# Patient Record
Sex: FEMALE | Race: WHITE | URBAN - METROPOLITAN AREA
[De-identification: names, ages, dates, MRNs, and addresses within clinical notes are randomized per-mention and may not be internally consistent; named-entity substitution may affect disease eponyms.]

---

## 2017-01-02 ENCOUNTER — APPOINTMENT (OUTPATIENT)
Dept: LAB | Facility: HOSPITAL | Age: 27
End: 2017-01-02
Attending: ADVANCED PRACTICE MIDWIFE
Payer: COMMERCIAL

## 2017-01-02 DIAGNOSIS — O20.0 THREATENED ABORTION IN EARLY PREGNANCY: ICD-10-CM

## 2017-01-02 LAB — B-HCG SERPL-ACNC: NORMAL MIU/ML

## 2017-01-02 PROCEDURE — 84702 CHORIONIC GONADOTROPIN TEST: CPT

## 2017-01-02 PROCEDURE — 36415 COLL VENOUS BLD VENIPUNCTURE: CPT

## 2017-01-03 ENCOUNTER — TELEPHONE (OUTPATIENT)
Dept: OBGYN CLINIC | Facility: CLINIC | Age: 27
End: 2017-01-03

## 2017-01-06 ENCOUNTER — HOSPITAL ENCOUNTER (OUTPATIENT)
Dept: ULTRASOUND IMAGING | Facility: HOSPITAL | Age: 27
Discharge: HOME | End: 2017-01-06
Attending: ADVANCED PRACTICE MIDWIFE
Payer: COMMERCIAL

## 2017-01-06 ENCOUNTER — OFFICE VISIT (OUTPATIENT)
Dept: OBGYN CLINIC | Facility: CLINIC | Age: 27
End: 2017-01-06

## 2017-01-06 VITALS — WEIGHT: 134 LBS | SYSTOLIC BLOOD PRESSURE: 122 MMHG | DIASTOLIC BLOOD PRESSURE: 70 MMHG | BODY MASS INDEX: 22 KG/M2

## 2017-01-06 DIAGNOSIS — O20.0 THREATENED ABORTION, ANTEPARTUM: Primary | ICD-10-CM

## 2017-01-06 DIAGNOSIS — O09.891 MEDICATION EXPOSURE DURING FIRST TRIMESTER OF PREGNANCY: ICD-10-CM

## 2017-01-06 DIAGNOSIS — O20.0 THREATENED ABORTION IN EARLY PREGNANCY: ICD-10-CM

## 2017-01-06 PROBLEM — F90.9 ADHD (ATTENTION DEFICIT HYPERACTIVITY DISORDER): Status: ACTIVE | Noted: 2017-01-06

## 2017-01-06 PROCEDURE — 76801 OB US < 14 WKS SINGLE FETUS: CPT

## 2017-01-06 PROCEDURE — 76817 TRANSVAGINAL US OBSTETRIC: CPT

## 2017-01-06 PROCEDURE — 99213 OFFICE O/P EST LOW 20 MIN: CPT | Performed by: ADVANCED PRACTICE MIDWIFE

## 2017-01-06 NOTE — PROGRESS NOTES
HPI:    Patient ID: Fabienne Bowen is a 27year old female presents for threatened AB follow up. Reports bleeding mostly resolved, but does notice some spotting on tissue after intercourse. Just came from u/s.  Reports hx of ADHD and depression say Psych and possible. No orders of the defined types were placed in this encounter.        Meds This Visit:  No prescriptions requested or ordered in this encounter    Imaging & Referrals:  None       #[id number]

## 2017-01-06 NOTE — PROGRESS NOTES
Order and Referral for MFM consult and FTS faxed to MFM at [facility]. They will call pt to schedule appt.

## 2017-01-19 ENCOUNTER — NURSE ONLY (OUTPATIENT)
Dept: OBGYN CLINIC | Facility: CLINIC | Age: 27
End: 2017-01-19

## 2017-01-19 VITALS — WEIGHT: 137.38 LBS | HEIGHT: 65 IN | BODY MASS INDEX: 22.89 KG/M2

## 2017-01-19 DIAGNOSIS — Z34.01 ENCOUNTER FOR SUPERVISION OF NORMAL FIRST PREGNANCY IN FIRST TRIMESTER: Primary | ICD-10-CM

## 2017-01-19 NOTE — PROGRESS NOTES
NOB Education complete and information given to pt. Pt has 2 cats. TOXO titer ordered with NOB labs. Pt states no hx of abnormal paps. Pt has appt scheduled for FTS with MFM. Pt completed EPDS Screen and scored 10.   Pt was advised to f/u with her psyc

## 2017-02-10 ENCOUNTER — HOSPITAL ENCOUNTER (OUTPATIENT)
Dept: PERINATAL CARE | Facility: HOSPITAL | Age: 27
Discharge: HOME | End: 2017-02-10
Attending: OBSTETRICS & GYNECOLOGY
Payer: COMMERCIAL

## 2017-02-10 VITALS — SYSTOLIC BLOOD PRESSURE: 116 MMHG | HEART RATE: 67 BPM | DIASTOLIC BLOOD PRESSURE: 70 MMHG

## 2017-02-10 DIAGNOSIS — O09.891 MEDICATION EXPOSURE DURING FIRST TRIMESTER OF PREGNANCY: ICD-10-CM

## 2017-02-10 DIAGNOSIS — Z36.9 FIRST TRIMESTER SCREENING: Primary | ICD-10-CM

## 2017-02-10 DIAGNOSIS — Z36.9 FIRST TRIMESTER SCREENING: ICD-10-CM

## 2017-02-10 PROCEDURE — 76813 OB US NUCHAL MEAS 1 GEST: CPT | Performed by: OBSTETRICS & GYNECOLOGY

## 2017-02-10 PROCEDURE — 99243 OFF/OP CNSLTJ NEW/EST LOW 30: CPT | Performed by: OBSTETRICS & GYNECOLOGY

## 2017-02-10 PROCEDURE — 76813 OB US NUCHAL MEAS 1 GEST: CPT

## 2017-02-10 NOTE — PROGRESS NOTES
Luana Ramos is a 27year old female. Reason for Consult:   First Trimester Screen. Medication exposure. PCOS. Was on Adderal, Lamictal, & Vyvance in September.   Review of History:     OB History:    Obstetric History     T0    TAB0  SA Registry (NAAED) suggest an increased incidence of cleft lip and/or cleft palate following first trimester exposure. Healthcare providers may enroll patients in the Lamotrigine(Lamictal ) Pregnancy Registry by calling [phone].   Lamictal is not recom Operative delivery   *  mortality   *  respiratory problems and metabolic complications   The spontaneous  rate in women with PCOS is 20 to 40 percent higher than the baseline in the general obstetric population.  No trials have bee care of your patient. Please do not hesitate to call with any questions or concerns. Total patient time was 40 minutes in evaluation, consultation, and coordination of care.   Greater than 50% of this time was spent in face to face discussion with the

## 2017-02-11 ENCOUNTER — INITIAL PRENATAL (OUTPATIENT)
Dept: OBGYN CLINIC | Facility: CLINIC | Age: 27
End: 2017-02-11

## 2017-02-11 VITALS
WEIGHT: 139 LBS | BODY MASS INDEX: 23 KG/M2 | DIASTOLIC BLOOD PRESSURE: 73 MMHG | SYSTOLIC BLOOD PRESSURE: 119 MMHG | HEART RATE: 73 BPM

## 2017-02-11 DIAGNOSIS — Z11.3 SCREEN FOR STD (SEXUALLY TRANSMITTED DISEASE): ICD-10-CM

## 2017-02-11 DIAGNOSIS — Z34.01 ENCOUNTER FOR SUPERVISION OF NORMAL FIRST PREGNANCY IN FIRST TRIMESTER: Primary | ICD-10-CM

## 2017-02-11 PROBLEM — F32.A ANXIETY AND DEPRESSION: Status: ACTIVE | Noted: 2017-02-11

## 2017-02-11 PROBLEM — F41.9 ANXIETY AND DEPRESSION: Status: ACTIVE | Noted: 2017-02-11

## 2017-02-11 LAB
APPEARANCE: CLEAR
MULTISTIX LOT#: NORMAL NUMERIC
PH, URINE: 5.5 (ref 4.5–8)
SPECIFIC GRAVITY: 1 (ref 1–1.03)
URINE-COLOR: YELLOW
UROBILINOGEN,SEMI-QN: 0 MG/DL (ref 0–1.9)

## 2017-02-11 PROCEDURE — 90686 IIV4 VACC NO PRSV 0.5 ML IM: CPT | Performed by: ADVANCED PRACTICE MIDWIFE

## 2017-02-11 PROCEDURE — 90471 IMMUNIZATION ADMIN: CPT | Performed by: ADVANCED PRACTICE MIDWIFE

## 2017-02-11 NOTE — PROGRESS NOTES
Had normal 1st tri screen. ADHD, Anxiety & depression stopped meds d/t pregnancy. Seeing Psychiatrist, recently started her on new med, still to , she unsure of name. Will let us know when she gets. Denies pain or bleeding. Normal pap 2 yrs ago.  Nor

## 2017-02-12 LAB
C TRACH DNA SPEC QL NAA+PROBE: NEGATIVE
N GONORRHOEA DNA SPEC QL NAA+PROBE: NEGATIVE

## 2017-02-14 ENCOUNTER — TELEPHONE (OUTPATIENT)
Dept: PERINATAL CARE | Facility: HOSPITAL | Age: 27
End: 2017-02-14

## 2017-02-14 ENCOUNTER — TELEPHONE (OUTPATIENT)
Dept: OBGYN CLINIC | Facility: CLINIC | Age: 27
End: 2017-02-14

## 2017-02-22 ENCOUNTER — TELEPHONE (OUTPATIENT)
Dept: OBGYN CLINIC | Facility: CLINIC | Age: 27
End: 2017-02-22

## 2017-02-22 NOTE — TELEPHONE ENCOUNTER
Pt was advised she has overdue PN labs that need to be completed ASAP.   Pt was advised on what needs to be done, to take the HIV consent sheet with her, do not eat for 1 hr prior to going to the lab and that she will remain at the lab for 1 hr after she dr

## 2017-03-07 ENCOUNTER — TELEPHONE (OUTPATIENT)
Dept: OBGYN CLINIC | Facility: CLINIC | Age: 27
End: 2017-03-07

## 2017-03-07 NOTE — TELEPHONE ENCOUNTER
Pt 16w0d calling to report dull aching pain on the lower right side of pelvic/groin area that started yesterday. Pt stated that the dull ache is constant and rates it as a 3/10.  Pt also reports that she is experiencing intermittent sharp/shooting pains in

## 2017-03-09 ENCOUNTER — TELEPHONE (OUTPATIENT)
Dept: OBGYN CLINIC | Facility: CLINIC | Age: 27
End: 2017-03-09

## 2017-03-09 NOTE — TELEPHONE ENCOUNTER
PT NOTIFIED OF MEDS FROM OTC LIST. REMINDED NOT TO TAKE ANYTHING WITH IBUPROFEN, ETOH, ASA OR ZINC. IF SYMPTOMS NOT RELIEVED WITH OTC MEDS OR IF RUNS TEMP, HAS YELLOW/GREEN MUCOUS TO SEE PCP. PT VERBALIZED UNDERSTANDING.

## 2017-03-11 ENCOUNTER — ROUTINE PRENATAL (OUTPATIENT)
Dept: OBGYN CLINIC | Facility: CLINIC | Age: 27
End: 2017-03-11

## 2017-03-11 ENCOUNTER — LAB ENCOUNTER (OUTPATIENT)
Dept: LAB | Facility: HOSPITAL | Age: 27
End: 2017-03-11
Attending: ADVANCED PRACTICE MIDWIFE
Payer: COMMERCIAL

## 2017-03-11 VITALS
WEIGHT: 149.81 LBS | DIASTOLIC BLOOD PRESSURE: 75 MMHG | HEART RATE: 97 BPM | BODY MASS INDEX: 25 KG/M2 | SYSTOLIC BLOOD PRESSURE: 120 MMHG

## 2017-03-11 DIAGNOSIS — F32.A ANXIETY AND DEPRESSION: ICD-10-CM

## 2017-03-11 DIAGNOSIS — F41.9 ANXIETY AND DEPRESSION: ICD-10-CM

## 2017-03-11 DIAGNOSIS — Z34.02 ENCOUNTER FOR SUPERVISION OF NORMAL FIRST PREGNANCY IN SECOND TRIMESTER: Primary | ICD-10-CM

## 2017-03-11 DIAGNOSIS — F90.0 ATTENTION DEFICIT HYPERACTIVITY DISORDER (ADHD), PREDOMINANTLY INATTENTIVE TYPE: ICD-10-CM

## 2017-03-11 DIAGNOSIS — Z34.01 ENCOUNTER FOR SUPERVISION OF NORMAL FIRST PREGNANCY IN FIRST TRIMESTER: ICD-10-CM

## 2017-03-11 LAB
ANTIBODY SCREEN: NEGATIVE
APPEARANCE: CLEAR
BASOPHILS # BLD: 0.1 K/UL (ref 0–0.2)
BASOPHILS NFR BLD: 1 %
EOSINOPHIL # BLD: 0.4 K/UL (ref 0–0.7)
EOSINOPHIL NFR BLD: 4 %
ERYTHROCYTE [DISTWIDTH] IN BLOOD BY AUTOMATED COUNT: 12.6 % (ref 11–15)
GLUCOSE 1H P 50 G GLC PO SERPL-MCNC: 111 MG/DL
HCT VFR BLD AUTO: 35.3 % (ref 35–48)
HGB BLD-MCNC: 11.9 G/DL (ref 12–16)
LYMPHOCYTES # BLD: 2.3 K/UL (ref 1–4)
LYMPHOCYTES NFR BLD: 24 %
MCH RBC QN AUTO: 29.8 PG (ref 27–32)
MCHC RBC AUTO-ENTMCNC: 33.7 G/DL (ref 32–37)
MCV RBC AUTO: 88.4 FL (ref 80–100)
MONOCYTES # BLD: 0.7 K/UL (ref 0–1)
MONOCYTES NFR BLD: 8 %
MULTISTIX LOT#: NORMAL NUMERIC
NEUTROPHILS # BLD AUTO: 6.1 K/UL (ref 1.8–7.7)
NEUTROPHILS NFR BLD: 64 %
PH, URINE: 7.5 (ref 4.5–8)
PLATELET # BLD AUTO: 245 K/UL (ref 140–400)
PMV BLD AUTO: 7.9 FL (ref 7.4–10.3)
RBC # BLD AUTO: 3.99 M/UL (ref 3.7–5.4)
RUBV IGG SER-ACNC: 52.4 IU/ML
SPECIFIC GRAVITY: 1.01 (ref 1–1.03)
URINE-COLOR: YELLOW
UROBILINOGEN,SEMI-QN: 0.2 MG/DL (ref 0–1.9)
WBC # BLD AUTO: 9.6 K/UL (ref 4–11)

## 2017-03-11 PROCEDURE — 86778 TOXOPLASMA ANTIBODY IGM: CPT

## 2017-03-11 PROCEDURE — 85025 COMPLETE CBC W/AUTO DIFF WBC: CPT

## 2017-03-11 PROCEDURE — 36415 COLL VENOUS BLD VENIPUNCTURE: CPT

## 2017-03-11 PROCEDURE — 86803 HEPATITIS C AB TEST: CPT

## 2017-03-11 PROCEDURE — 86762 RUBELLA ANTIBODY: CPT

## 2017-03-11 PROCEDURE — 82306 VITAMIN D 25 HYDROXY: CPT

## 2017-03-11 PROCEDURE — 87389 HIV-1 AG W/HIV-1&-2 AB AG IA: CPT

## 2017-03-11 PROCEDURE — 86780 TREPONEMA PALLIDUM: CPT

## 2017-03-11 PROCEDURE — 87086 URINE CULTURE/COLONY COUNT: CPT

## 2017-03-11 PROCEDURE — 87340 HEPATITIS B SURFACE AG IA: CPT

## 2017-03-11 PROCEDURE — 82950 GLUCOSE TEST: CPT

## 2017-03-11 PROCEDURE — 86777 TOXOPLASMA ANTIBODY: CPT

## 2017-03-11 PROCEDURE — 86850 RBC ANTIBODY SCREEN: CPT

## 2017-03-11 NOTE — PROGRESS NOTES
+ FM Pt reports struggling at work since off meds. No longer seeing psychiatrist in city due to commute. Stable and supportive FOB and his family. Desires referral to psychiatrist.  Rigoberto Burns information and referral to navigator given.   Warning signs

## 2017-03-13 ENCOUNTER — APPOINTMENT (OUTPATIENT)
Dept: ULTRASOUND IMAGING | Facility: HOSPITAL | Age: 27
End: 2017-03-13
Attending: NURSE PRACTITIONER
Payer: COMMERCIAL

## 2017-03-13 ENCOUNTER — HOSPITAL ENCOUNTER (EMERGENCY)
Facility: HOSPITAL | Age: 27
Discharge: HOME | End: 2017-03-13
Payer: COMMERCIAL

## 2017-03-13 ENCOUNTER — TELEPHONE (OUTPATIENT)
Dept: OBGYN CLINIC | Facility: CLINIC | Age: 27
End: 2017-03-13

## 2017-03-13 VITALS
RESPIRATION RATE: 16 BRPM | TEMPERATURE: 98 F | DIASTOLIC BLOOD PRESSURE: 56 MMHG | BODY MASS INDEX: 24 KG/M2 | SYSTOLIC BLOOD PRESSURE: 124 MMHG | OXYGEN SATURATION: 98 % | WEIGHT: 143 LBS | HEART RATE: 88 BPM

## 2017-03-13 DIAGNOSIS — O26.892 ABDOMINAL PAIN IN PREGNANCY, SECOND TRIMESTER: Primary | ICD-10-CM

## 2017-03-13 DIAGNOSIS — R10.9 ABDOMINAL PAIN IN PREGNANCY, SECOND TRIMESTER: Primary | ICD-10-CM

## 2017-03-13 LAB
25(OH)D3 SERPL-MCNC: 20.8 NG/ML
ALBUMIN SERPL BCP-MCNC: 3.4 G/DL (ref 3.5–4.8)
ALP SERPL-CCNC: 48 U/L (ref 32–100)
ALT SERPL-CCNC: 34 U/L (ref 14–54)
ANION GAP SERPL CALC-SCNC: 9 MMOL/L (ref 0–18)
AST SERPL-CCNC: 27 U/L (ref 15–41)
BASOPHILS # BLD: 0.1 K/UL (ref 0–0.2)
BASOPHILS NFR BLD: 1 %
BILIRUB DIRECT SERPL-MCNC: 0.1 MG/DL (ref 0–0.2)
BILIRUB SERPL-MCNC: 0.4 MG/DL (ref 0.3–1.2)
BILIRUB UR QL: NEGATIVE
BUN SERPL-MCNC: 5 MG/DL (ref 8–20)
BUN/CREAT SERPL: 10 (ref 10–20)
CALCIUM SERPL-MCNC: 9.2 MG/DL (ref 8.5–10.5)
CHLORIDE SERPL-SCNC: 105 MMOL/L (ref 95–110)
CLARITY UR: CLEAR
CO2 SERPL-SCNC: 23 MMOL/L (ref 22–32)
COLOR UR: YELLOW
CREAT SERPL-MCNC: 0.5 MG/DL (ref 0.5–1.5)
EOSINOPHIL # BLD: 0.3 K/UL (ref 0–0.7)
EOSINOPHIL NFR BLD: 4 %
ERYTHROCYTE [DISTWIDTH] IN BLOOD BY AUTOMATED COUNT: 12.7 % (ref 11–15)
GLUCOSE SERPL-MCNC: 86 MG/DL (ref 70–99)
GLUCOSE UR-MCNC: NEGATIVE MG/DL
HBV SURFACE AG SERPL QL IA: NONREACTIVE
HCT VFR BLD AUTO: 35.7 % (ref 35–48)
HCV AB SERPL QL IA: NONREACTIVE
HGB BLD-MCNC: 12.2 G/DL (ref 12–16)
HGB UR QL STRIP.AUTO: NEGATIVE
HIV1+2 AB SERPL QL IA: NONREACTIVE
KETONES UR-MCNC: NEGATIVE MG/DL
LEUKOCYTE ESTERASE UR QL STRIP.AUTO: NEGATIVE
LIPASE SERPL-CCNC: 28 U/L (ref 22–51)
LYMPHOCYTES # BLD: 2.6 K/UL (ref 1–4)
LYMPHOCYTES NFR BLD: 28 %
MCH RBC QN AUTO: 30 PG (ref 27–32)
MCHC RBC AUTO-ENTMCNC: 34.1 G/DL (ref 32–37)
MCV RBC AUTO: 88 FL (ref 80–100)
MONOCYTES # BLD: 0.8 K/UL (ref 0–1)
MONOCYTES NFR BLD: 8 %
NEUTROPHILS # BLD AUTO: 5.4 K/UL (ref 1.8–7.7)
NEUTROPHILS NFR BLD: 59 %
NITRITE UR QL STRIP.AUTO: NEGATIVE
OSMOLALITY UR CALC.SUM OF ELEC: 281 MOSM/KG (ref 275–295)
PH UR: 7 [PH] (ref 5–8)
PLATELET # BLD AUTO: 265 K/UL (ref 140–400)
PMV BLD AUTO: 7.5 FL (ref 7.4–10.3)
POTASSIUM SERPL-SCNC: 3.8 MMOL/L (ref 3.3–5.1)
PROT SERPL-MCNC: 6.5 G/DL (ref 5.9–8.4)
PROT UR-MCNC: NEGATIVE MG/DL
RBC # BLD AUTO: 4.06 M/UL (ref 3.7–5.4)
SODIUM SERPL-SCNC: 137 MMOL/L (ref 136–144)
SP GR UR STRIP: 1 (ref 1–1.03)
T PALLIDUM AB SER QL: NEGATIVE
UROBILINOGEN UR STRIP-ACNC: <2
VIT C UR-MCNC: NEGATIVE MG/DL
WBC # BLD AUTO: 9.2 K/UL (ref 4–11)

## 2017-03-13 PROCEDURE — 80048 BASIC METABOLIC PNL TOTAL CA: CPT

## 2017-03-13 PROCEDURE — 36415 COLL VENOUS BLD VENIPUNCTURE: CPT

## 2017-03-13 PROCEDURE — 99284 EMERGENCY DEPT VISIT MOD MDM: CPT

## 2017-03-13 PROCEDURE — 85025 COMPLETE CBC W/AUTO DIFF WBC: CPT

## 2017-03-13 PROCEDURE — 76815 OB US LIMITED FETUS(S): CPT | Performed by: RADIOLOGY

## 2017-03-13 PROCEDURE — 83690 ASSAY OF LIPASE: CPT | Performed by: NURSE PRACTITIONER

## 2017-03-13 PROCEDURE — 81003 URINALYSIS AUTO W/O SCOPE: CPT

## 2017-03-13 PROCEDURE — 80076 HEPATIC FUNCTION PANEL: CPT | Performed by: NURSE PRACTITIONER

## 2017-03-13 NOTE — ED NOTES
Pt requesting to eat    Per Eran Schneider NP ok for pt to eat    [place] and juice provided to pt.

## 2017-03-13 NOTE — TELEPHONE ENCOUNTER
Pt is 16.6 weeks and c/o past 2-3 days she has been having abdominal cramping like a menses. Pt states pain today has increased in the whole abdomen and the cramping is constant. Pt states she is not able to sit up straight today.    Pt rates the pain at

## 2017-03-13 NOTE — TELEPHONE ENCOUNTER
Patient has been waiting in the ER now for 1 hour and was informed she will have to probably wait another 2 hours. Per MBW in office she can listen to heart tone in office but would have to be evaluated after by the ER due to the extreme pain.  Patient stat

## 2017-03-14 LAB
TOXOPLASMA GONDII AB, IGG: 45.2 IU/ML
TOXOPLASMA GONDII AB, IGM: <3 AU/ML

## 2017-03-14 NOTE — ED PROVIDER NOTES
Patient Seen in: [facility] AND [facility] Emergency Department    History   Patient presents with:  Abdomen/Flank Pain (GI/)  Pregnancy Issues (gynecologic)    Stated Complaint: 17wks preg.  abd cramping    HPI    29-year-old female,  1 para 0 17 weeks 1342 85   Resp 03/13/17 1342 18   Temp 03/13/17 1342 97.9 °F (36.6 °C)   Temp src 03/13/17 1342 Oral   SpO2 03/13/17 1342 99 %   O2 Device 03/13/17 1603 None (Room air)       Current:/56 mmHg  Pulse 88  Temp(Src) 97.9 °F (36.6 °C) (Oral)  Resp 16  Wt DARK GREEN   RAINBOW DRAW LAVENDER TALL (BNP)   CBC W/ DIFFERENTIAL       MDM   Consult with [facility] midwife  Unaware of the ultrasound findings and normal lab work. No white count no concern for cholecystitis.   Patient will follow-up in the office saman hyman

## 2017-04-08 ENCOUNTER — ROUTINE PRENATAL (OUTPATIENT)
Dept: OBGYN CLINIC | Facility: CLINIC | Age: 27
End: 2017-04-08

## 2017-04-08 VITALS
WEIGHT: 154.63 LBS | SYSTOLIC BLOOD PRESSURE: 134 MMHG | DIASTOLIC BLOOD PRESSURE: 71 MMHG | HEART RATE: 76 BPM | BODY MASS INDEX: 26 KG/M2

## 2017-04-08 DIAGNOSIS — Z34.02 ENCOUNTER FOR SUPERVISION OF NORMAL FIRST PREGNANCY IN SECOND TRIMESTER: Primary | ICD-10-CM

## 2017-04-08 NOTE — PROGRESS NOTES
Active fetus  No complaints  U/S results reviewed with pt.   Pt to have Level @ u/s Warning signs reviewed All questions answered

## 2017-04-10 ENCOUNTER — HOSPITAL ENCOUNTER (OUTPATIENT)
Dept: PERINATAL CARE | Facility: HOSPITAL | Age: 27
Discharge: HOME | End: 2017-04-10
Attending: OBSTETRICS & GYNECOLOGY
Payer: COMMERCIAL

## 2017-04-10 VITALS — DIASTOLIC BLOOD PRESSURE: 57 MMHG | SYSTOLIC BLOOD PRESSURE: 119 MMHG | HEART RATE: 70 BPM

## 2017-04-10 DIAGNOSIS — O09.891 MEDICATION EXPOSURE DURING FIRST TRIMESTER OF PREGNANCY: ICD-10-CM

## 2017-04-10 DIAGNOSIS — F90.9 ATTENTION DEFICIT HYPERACTIVITY DISORDER (ADHD), UNSPECIFIED ADHD TYPE: ICD-10-CM

## 2017-04-10 DIAGNOSIS — F32.A ANXIETY AND DEPRESSION: ICD-10-CM

## 2017-04-10 DIAGNOSIS — E28.2 PCOS (POLYCYSTIC OVARIAN SYNDROME): ICD-10-CM

## 2017-04-10 DIAGNOSIS — Z36.3 SCREENING, ANTENATAL, FOR MALFORMATION BY ULTRASOUND: ICD-10-CM

## 2017-04-10 DIAGNOSIS — F41.9 ANXIETY AND DEPRESSION: ICD-10-CM

## 2017-04-10 DIAGNOSIS — O09.891 MEDICATION EXPOSURE DURING FIRST TRIMESTER OF PREGNANCY: Primary | ICD-10-CM

## 2017-04-10 PROCEDURE — 76811 OB US DETAILED SNGL FETUS: CPT

## 2017-04-10 PROCEDURE — 99214 OFFICE O/P EST MOD 30 MIN: CPT | Performed by: OBSTETRICS & GYNECOLOGY

## 2017-04-10 PROCEDURE — 76811 OB US DETAILED SNGL FETUS: CPT | Performed by: OBSTETRICS & GYNECOLOGY

## 2017-04-10 NOTE — PROGRESS NOTES
MFM follow up outpatient consultation -     S: Feeling low in her moods and having trouble concentrating. Not getting in trouble at work. Meeting with therapist and has appt with psychiatrist tomorrow to discuss medications for her mood.     Her history w studies. Teratogenic effects in animals were not observed. Lamictal crosses the human placenta and can be measured in the plasma of exposed newborns.  Preliminary data from the [address] [place] [address] Pregnancy Registry ([facility]) suggest an increase obstetrician, primary healthcare provider, and pediatrician (ACOG, 2007).  The ACOG also recommend that therapy with paroxetine(Paxil) be avoided during pregnancy if possible and that fetuses exposed in early pregnancy be assessed with a fetal echocardiogra

## 2017-04-10 NOTE — PROGRESS NOTES
Pt for Level @ U/s  Hx drug exposure 1st trimester  Low risk 1st tri  Pt aware male fetus  \"Went to a peek u/s\"  Denies pregnancy complaints

## 2017-05-06 ENCOUNTER — ROUTINE PRENATAL (OUTPATIENT)
Dept: OBGYN CLINIC | Facility: CLINIC | Age: 27
End: 2017-05-06

## 2017-05-06 VITALS
SYSTOLIC BLOOD PRESSURE: 126 MMHG | WEIGHT: 160.19 LBS | HEART RATE: 85 BPM | DIASTOLIC BLOOD PRESSURE: 75 MMHG | BODY MASS INDEX: 27 KG/M2

## 2017-05-06 DIAGNOSIS — Z34.02 ENCOUNTER FOR SUPERVISION OF NORMAL FIRST PREGNANCY IN SECOND TRIMESTER: Primary | ICD-10-CM

## 2017-06-03 ENCOUNTER — APPOINTMENT (OUTPATIENT)
Dept: LAB | Facility: HOSPITAL | Age: 27
End: 2017-06-03
Attending: ADVANCED PRACTICE MIDWIFE
Payer: COMMERCIAL

## 2017-06-03 ENCOUNTER — ROUTINE PRENATAL (OUTPATIENT)
Dept: OBGYN CLINIC | Facility: CLINIC | Age: 27
End: 2017-06-03

## 2017-06-03 VITALS
SYSTOLIC BLOOD PRESSURE: 117 MMHG | HEART RATE: 101 BPM | WEIGHT: 168.63 LBS | DIASTOLIC BLOOD PRESSURE: 75 MMHG | BODY MASS INDEX: 28 KG/M2

## 2017-06-03 DIAGNOSIS — Z34.03 ENCOUNTER FOR SUPERVISION OF NORMAL FIRST PREGNANCY IN THIRD TRIMESTER: Primary | ICD-10-CM

## 2017-06-03 DIAGNOSIS — Z34.02 ENCOUNTER FOR SUPERVISION OF NORMAL FIRST PREGNANCY IN SECOND TRIMESTER: ICD-10-CM

## 2017-06-03 PROCEDURE — 90471 IMMUNIZATION ADMIN: CPT | Performed by: ADVANCED PRACTICE MIDWIFE

## 2017-06-03 PROCEDURE — 90715 TDAP VACCINE 7 YRS/> IM: CPT | Performed by: ADVANCED PRACTICE MIDWIFE

## 2017-06-03 PROCEDURE — 85027 COMPLETE CBC AUTOMATED: CPT

## 2017-06-03 PROCEDURE — 82950 GLUCOSE TEST: CPT

## 2017-06-03 PROCEDURE — 36415 COLL VENOUS BLD VENIPUNCTURE: CPT

## 2017-06-03 NOTE — PROGRESS NOTES
Feeling well, active baby. Denies s/s PTL. Tdap today. Having 3 T labs done following visit. Reviewed water birth study and consent given for review. Discussed healthy weight gain in pregnancy and importance of healthy diet and exercise.  Reviewed Domo hyman

## 2017-06-08 ENCOUNTER — TELEPHONE (OUTPATIENT)
Dept: OBGYN CLINIC | Facility: CLINIC | Age: 27
End: 2017-06-08

## 2017-06-08 DIAGNOSIS — O99.810 ABNORMAL GLUCOSE TOLERANCE TEST DURING PREGNANCY, NOT YET DELIVERED: ICD-10-CM

## 2017-06-08 DIAGNOSIS — Z34.03 ENCOUNTER FOR SUPERVISION OF NORMAL FIRST PREGNANCY IN THIRD TRIMESTER: Primary | ICD-10-CM

## 2017-06-08 NOTE — TELEPHONE ENCOUNTER
----- Message from Katie Lamas CNM sent at 6/8/2017 12:52 PM CDT -----  Call pt -Abnormal 1 hr GTT please order a HAIC and 3 hr GTT.   CBC normal for pregnancy

## 2017-06-08 NOTE — TELEPHONE ENCOUNTER
Notified pt of normal cbc &  abnormal 1hr gtt & MES orders. Advised pt to fast 12 hours prior, except for water, & # giv for scheduling.  Pt verbalized an understanding & agrees w/ plan

## 2017-06-11 ENCOUNTER — LAB ENCOUNTER (OUTPATIENT)
Dept: LAB | Facility: HOSPITAL | Age: 27
End: 2017-06-11
Attending: ADVANCED PRACTICE MIDWIFE
Payer: COMMERCIAL

## 2017-06-11 DIAGNOSIS — Z34.03 ENCOUNTER FOR SUPERVISION OF NORMAL FIRST PREGNANCY IN THIRD TRIMESTER: ICD-10-CM

## 2017-06-11 DIAGNOSIS — O99.810 ABNORMAL GLUCOSE TOLERANCE TEST DURING PREGNANCY, NOT YET DELIVERED: ICD-10-CM

## 2017-06-11 PROCEDURE — 82951 GLUCOSE TOLERANCE TEST (GTT): CPT

## 2017-06-11 PROCEDURE — 83036 HEMOGLOBIN GLYCOSYLATED A1C: CPT

## 2017-06-11 PROCEDURE — 36415 COLL VENOUS BLD VENIPUNCTURE: CPT

## 2017-06-11 PROCEDURE — 82952 GTT-ADDED SAMPLES: CPT

## 2017-06-14 ENCOUNTER — ROUTINE PRENATAL (OUTPATIENT)
Dept: OBGYN CLINIC | Facility: CLINIC | Age: 27
End: 2017-06-14

## 2017-06-14 ENCOUNTER — TELEPHONE (OUTPATIENT)
Dept: OBGYN CLINIC | Facility: CLINIC | Age: 27
End: 2017-06-14

## 2017-06-14 ENCOUNTER — HOSPITAL ENCOUNTER (OUTPATIENT)
Facility: HOSPITAL | Age: 27
Setting detail: OBSERVATION
Discharge: HOME | End: 2017-06-14
Attending: ADVANCED PRACTICE MIDWIFE | Admitting: OBSTETRICS & GYNECOLOGY
Payer: COMMERCIAL

## 2017-06-14 VITALS
HEART RATE: 91 BPM | TEMPERATURE: 98 F | DIASTOLIC BLOOD PRESSURE: 70 MMHG | RESPIRATION RATE: 17 BRPM | SYSTOLIC BLOOD PRESSURE: 115 MMHG

## 2017-06-14 VITALS
HEART RATE: 96 BPM | DIASTOLIC BLOOD PRESSURE: 74 MMHG | SYSTOLIC BLOOD PRESSURE: 113 MMHG | WEIGHT: 173 LBS | BODY MASS INDEX: 29 KG/M2

## 2017-06-14 DIAGNOSIS — N89.8 VAGINAL DISCHARGE: ICD-10-CM

## 2017-06-14 DIAGNOSIS — Z34.03 ENCOUNTER FOR SUPERVISION OF NORMAL FIRST PREGNANCY IN THIRD TRIMESTER: Primary | ICD-10-CM

## 2017-06-14 PROCEDURE — 59025 FETAL NON-STRESS TEST: CPT | Performed by: ADVANCED PRACTICE MIDWIFE

## 2017-06-14 RX ORDER — SODIUM CHLORIDE, SODIUM LACTATE, POTASSIUM CHLORIDE, CALCIUM CHLORIDE 600; 310; 30; 20 MG/100ML; MG/100ML; MG/100ML; MG/100ML
INJECTION, SOLUTION INTRAVENOUS CONTINUOUS
Status: DISCONTINUED | OUTPATIENT
Start: 2017-06-14 | End: 2017-06-14

## 2017-06-14 RX ORDER — SODIUM CHLORIDE, SODIUM LACTATE, POTASSIUM CHLORIDE, CALCIUM CHLORIDE 600; 310; 30; 20 MG/100ML; MG/100ML; MG/100ML; MG/100ML
INJECTION, SOLUTION INTRAVENOUS
Status: DISCONTINUED
Start: 2017-06-14 | End: 2017-06-14

## 2017-06-14 NOTE — NST
Nonstress Test   Patient: Julia Becerra    Gestation: 30w1d    NST:       Variability: Moderate           Accelerations: Yes           Decelerations: None            Baseline: 130 BPM           Uterine Irritability: Yes           Contractions: Irregular

## 2017-06-14 NOTE — TELEPHONE ENCOUNTER
Pt c/o abd cramping, states pain is in the middle of her abd. Rates pain 5 out 10 on pain scale of 10. States pain comes and go. States pain goes back and forth from sharp to dull. States that she feels like she has to have a bowel movement but can't.  Stat

## 2017-06-14 NOTE — PROGRESS NOTES
Active fetus. Pt reports abdominal pain and tightening Unsure if she is having contractions. Denies any bleeding or leakage of fluid. No urinary symptoms  Abdomen gravid, soft non tender. FFN completed and sent to triage with pt.   Will send if contracti

## 2017-06-16 NOTE — PROGRESS NOTES
S: Pt was sent over from office with c/o of abdominal cramping for monitoring. O: FHT: 135, moderate varibility, + accels, no decels  [place]: initially a lot of irritability with some contractions.  After IV hydration ctx spaced out to q 13-16 min with pt fe

## 2017-06-19 ENCOUNTER — TELEPHONE (OUTPATIENT)
Dept: OBGYN CLINIC | Facility: CLINIC | Age: 27
End: 2017-06-19

## 2017-06-19 ENCOUNTER — HOSPITAL ENCOUNTER (OUTPATIENT)
Facility: HOSPITAL | Age: 27
Setting detail: OBSERVATION
Discharge: HOME | End: 2017-06-19
Attending: ADVANCED PRACTICE MIDWIFE | Admitting: OBSTETRICS & GYNECOLOGY
Payer: COMMERCIAL

## 2017-06-19 VITALS — SYSTOLIC BLOOD PRESSURE: 129 MMHG | DIASTOLIC BLOOD PRESSURE: 77 MMHG | HEART RATE: 104 BPM

## 2017-06-19 PROCEDURE — 59025 FETAL NON-STRESS TEST: CPT | Performed by: ADVANCED PRACTICE MIDWIFE

## 2017-06-19 RX ORDER — TERBUTALINE SULFATE 1 MG/ML
INJECTION, SOLUTION SUBCUTANEOUS
Status: COMPLETED
Start: 2017-06-19 | End: 2017-06-19

## 2017-06-19 RX ORDER — DEXTROSE, SODIUM CHLORIDE, SODIUM LACTATE, POTASSIUM CHLORIDE, AND CALCIUM CHLORIDE 5; .6; .31; .03; .02 G/100ML; G/100ML; G/100ML; G/100ML; G/100ML
INJECTION, SOLUTION INTRAVENOUS CONTINUOUS
Status: DISCONTINUED | OUTPATIENT
Start: 2017-06-19 | End: 2017-06-19

## 2017-06-19 RX ORDER — TERBUTALINE SULFATE 1 MG/ML
0.25 INJECTION, SOLUTION SUBCUTANEOUS ONCE
Status: DISCONTINUED | OUTPATIENT
Start: 2017-06-19 | End: 2017-06-19

## 2017-06-19 RX ORDER — DEXTROSE, SODIUM CHLORIDE, SODIUM LACTATE, POTASSIUM CHLORIDE, AND CALCIUM CHLORIDE 5; .6; .31; .03; .02 G/100ML; G/100ML; G/100ML; G/100ML; G/100ML
INJECTION, SOLUTION INTRAVENOUS
Status: COMPLETED
Start: 2017-06-19 | End: 2017-06-19

## 2017-06-19 NOTE — TRIAGE
[facility]      Triage Note    Jh Pizarro Patient Status:  Observation    1990 MRN [id number]   Location [address] [facility] [address] C-D Attending Jerilyn Ellis, [address] [facility] Day # 0 PCP None Pcp       Radha Para:    Estimated Da presented for decreased fetal movement at 30 6/7 weeks gestation . Reactive NST. M Sarrocco at bedside and viewed monitor strip and noted irritability. VE done per Aye Galindo and cervix closed. Order recd for IV bolus and terbutaline.   POC explained to

## 2017-06-19 NOTE — TELEPHONE ENCOUNTER
Per phone room, pt left Netformxhart message for decreased fm. LMTCB immediately.  Notified MES of note & she advised pt go to [facility] for NST once I get ahold of pt

## 2017-06-19 NOTE — TELEPHONE ENCOUNTER
Pt returned call. Stated she had reschedule her appt & noted that she has felt a decrease in fm over the last 24hrs. Advised pt to call for that concern rather than send MyChart message as we may not see it.  Instructed pt to go to [facility] Triage per MES & have

## 2017-06-19 NOTE — PROGRESS NOTES
Vickey Gallegos has consulted with Xochitl Palm.  Pt will go home on bedrest and return tomorrow at 1230 for a MFM cervical length US

## 2017-06-20 ENCOUNTER — HOSPITAL ENCOUNTER (OUTPATIENT)
Dept: PERINATAL CARE | Facility: HOSPITAL | Age: 27
Discharge: HOME | End: 2017-06-20
Attending: OBSTETRICS & GYNECOLOGY
Payer: COMMERCIAL

## 2017-06-20 VITALS — DIASTOLIC BLOOD PRESSURE: 74 MMHG | HEART RATE: 97 BPM | SYSTOLIC BLOOD PRESSURE: 112 MMHG

## 2017-06-20 DIAGNOSIS — O47.00 PRETERM CONTRACTIONS: ICD-10-CM

## 2017-06-20 DIAGNOSIS — F41.9 ANXIETY AND DEPRESSION: ICD-10-CM

## 2017-06-20 DIAGNOSIS — O09.891 MEDICATION EXPOSURE DURING FIRST TRIMESTER OF PREGNANCY: Primary | ICD-10-CM

## 2017-06-20 DIAGNOSIS — O09.891 MEDICATION EXPOSURE DURING FIRST TRIMESTER OF PREGNANCY: ICD-10-CM

## 2017-06-20 DIAGNOSIS — F32.A ANXIETY AND DEPRESSION: ICD-10-CM

## 2017-06-20 DIAGNOSIS — F90.9 ATTENTION DEFICIT HYPERACTIVITY DISORDER (ADHD), UNSPECIFIED ADHD TYPE: ICD-10-CM

## 2017-06-20 PROCEDURE — 76805 OB US >/= 14 WKS SNGL FETUS: CPT | Performed by: OBSTETRICS & GYNECOLOGY

## 2017-06-20 PROCEDURE — 76817 TRANSVAGINAL US OBSTETRIC: CPT | Performed by: OBSTETRICS & GYNECOLOGY

## 2017-06-20 PROCEDURE — 99213 OFFICE O/P EST LOW 20 MIN: CPT | Performed by: OBSTETRICS & GYNECOLOGY

## 2017-06-20 NOTE — PROGRESS NOTES
Pt for CL and growth U/S  Hx marginal cord insertion, medication exposure, and  ctx  States feels dizzy 4-6 times per day  Lasting 12 seconds at a time  Denies happening when changing positions  States active fetus

## 2017-06-20 NOTE — PROGRESS NOTES
MFM follow up outpatient consultation -     S: Moods better with sertraline & therapist.  Good FM, no complaints. She pasted her 3 hour GTT    Her history was reviewed form her visit on 2/10/17 and there were no interval changes.     O:  /74 mmHg  Pu cleft palate following first trimester exposure. Healthcare providers may enroll patients in the Lamotrigine(Lamictal ) Pregnancy Registry by calling [phone].  Lamictal is not recommended during Breastfeeding.               We discussed what is known treated maternal mood disorders has other effects on  behavior that can be related to impaired mother-infant bonding from inadequately treated depression/anxiety.  Although this outcome is more difficult to measure, the repercussions can be signific

## 2017-06-21 NOTE — ADDENDUM NOTE
Encounter addended by: Lashawn Snyder on: 6/21/2017 12:31 PM<BR>     Documentation filed: Charges VN

## 2017-07-05 ENCOUNTER — ROUTINE PRENATAL (OUTPATIENT)
Dept: OBGYN CLINIC | Facility: CLINIC | Age: 27
End: 2017-07-05

## 2017-07-05 VITALS
WEIGHT: 179 LBS | BODY MASS INDEX: 30 KG/M2 | SYSTOLIC BLOOD PRESSURE: 116 MMHG | DIASTOLIC BLOOD PRESSURE: 72 MMHG | HEART RATE: 88 BPM

## 2017-07-05 DIAGNOSIS — Z34.03 ENCOUNTER FOR SUPERVISION OF NORMAL FIRST PREGNANCY IN THIRD TRIMESTER: Primary | ICD-10-CM

## 2017-07-05 LAB
APPEARANCE: CLEAR
MULTISTIX LOT#: NORMAL NUMERIC
PH, URINE: 7 (ref 4.5–8)
SPECIFIC GRAVITY: 1.02 (ref 1–1.03)
URINE-COLOR: YELLOW
UROBILINOGEN,SEMI-QN: 0 MG/DL (ref 0–1.9)

## 2017-07-05 PROCEDURE — 81002 URINALYSIS NONAUTO W/O SCOPE: CPT | Performed by: ADVANCED PRACTICE MIDWIFE

## 2017-07-05 NOTE — PROGRESS NOTES
Active fetus  Cephalic presentation  Pt continues to have BH contractions but they are decresing.   Denies any leakage of fluid or bloody show  PTL precautions

## 2017-07-18 ENCOUNTER — TELEPHONE (OUTPATIENT)
Dept: OBGYN CLINIC | Facility: CLINIC | Age: 27
End: 2017-07-18

## 2017-07-18 NOTE — TELEPHONE ENCOUNTER
Pt called c/o \"not feeling right\" all day long. Pt c/o feel nauseated & abd pressure. In the last 2 hours pt c/o ctx & pelvic pain q15 mins Pain scale 7-8/10. Denies back or thigh pain. No increased freq or pain w/ urination.  Normal bm this am, Denies va

## 2017-07-19 ENCOUNTER — ROUTINE PRENATAL (OUTPATIENT)
Dept: OBGYN CLINIC | Facility: CLINIC | Age: 27
End: 2017-07-19

## 2017-07-19 VITALS
SYSTOLIC BLOOD PRESSURE: 127 MMHG | WEIGHT: 183.81 LBS | BODY MASS INDEX: 31 KG/M2 | HEART RATE: 91 BPM | DIASTOLIC BLOOD PRESSURE: 76 MMHG

## 2017-07-19 DIAGNOSIS — Z34.03 ENCOUNTER FOR SUPERVISION OF NORMAL FIRST PREGNANCY IN THIRD TRIMESTER: Primary | ICD-10-CM

## 2017-07-19 LAB
APPEARANCE: CLEAR
MULTISTIX LOT#: NORMAL NUMERIC
PH, URINE: 5 (ref 4.5–8)
SPECIFIC GRAVITY: 1 (ref 1–1.03)
URINE-COLOR: YELLOW
UROBILINOGEN,SEMI-QN: 0.2 MG/DL (ref 0–1.9)

## 2017-07-19 RX ORDER — BREAST PUMP
EACH MISCELLANEOUS
Qty: 1 EACH | Refills: 0 | Status: SHIPPED | COMMUNITY
Start: 2017-07-19 | End: 2017-07-19

## 2017-07-29 ENCOUNTER — ROUTINE PRENATAL (OUTPATIENT)
Dept: OBGYN CLINIC | Facility: CLINIC | Age: 27
End: 2017-07-29

## 2017-07-29 VITALS
DIASTOLIC BLOOD PRESSURE: 74 MMHG | BODY MASS INDEX: 31 KG/M2 | WEIGHT: 184 LBS | HEART RATE: 78 BPM | SYSTOLIC BLOOD PRESSURE: 115 MMHG

## 2017-07-29 DIAGNOSIS — Z34.03 ENCOUNTER FOR SUPERVISION OF NORMAL FIRST PREGNANCY IN THIRD TRIMESTER: Primary | ICD-10-CM

## 2017-07-29 LAB
APPEARANCE: CLEAR
MULTISTIX LOT#: NORMAL NUMERIC
PH, URINE: 6.5 (ref 4.5–8)
SPECIFIC GRAVITY: 1.01 (ref 1–1.03)
URINE-COLOR: YELLOW
UROBILINOGEN,SEMI-QN: 0.2 MG/DL (ref 0–1.9)

## 2017-08-01 NOTE — PROGRESS NOTES
Active baby. Very uncomfortable with BH contractions. Denies leaking or bleeding. Discussed comfort measures. GBS neg. Planning waterbirth, vit K and eye ointment; discussed baby doc - undecided,.

## 2017-08-03 ENCOUNTER — HOSPITAL ENCOUNTER (OUTPATIENT)
Facility: HOSPITAL | Age: 27
Setting detail: OBSERVATION
Discharge: HOME | End: 2017-08-03
Attending: ADVANCED PRACTICE MIDWIFE | Admitting: OBSTETRICS & GYNECOLOGY
Payer: COMMERCIAL

## 2017-08-03 ENCOUNTER — TELEPHONE (OUTPATIENT)
Dept: PEDIATRICS CLINIC | Facility: CLINIC | Age: 27
End: 2017-08-03

## 2017-08-03 VITALS — HEART RATE: 95 BPM | SYSTOLIC BLOOD PRESSURE: 119 MMHG | DIASTOLIC BLOOD PRESSURE: 70 MMHG

## 2017-08-03 PROBLEM — R51.9 HEADACHE: Status: ACTIVE | Noted: 2017-08-03

## 2017-08-03 LAB
BILIRUB UR QL STRIP: NEGATIVE
BILIRUB UR QL: NEGATIVE
COLOR UR: YELLOW
COLOR UR: YELLOW
GLUCOSE UR STRIP-MCNC: NEGATIVE MG/DL
GLUCOSE UR-MCNC: NEGATIVE MG/DL
HGB UR QL STRIP.AUTO: NEGATIVE
HGB UR QL STRIP: NEGATIVE
KETONES UR STRIP-MCNC: NEGATIVE MG/DL
KETONES UR-MCNC: NEGATIVE MG/DL
NITRITE UR QL STRIP.AUTO: NEGATIVE
NITRITE UR QL STRIP: NEGATIVE
PH UR STRIP: 6 [PH]
PH UR: 6 [PH] (ref 5–8)
PROT UR STRIP-MCNC: NEGATIVE MG/DL
PROT UR-MCNC: NEGATIVE MG/DL
RBC #/AREA URNS AUTO: 2 /HPF
SP GR UR STRIP: 1.01 (ref 1–1.03)
SP GR UR STRIP: <=1.005
UROBILINOGEN UR STRIP-ACNC: <2
UROBILINOGEN UR STRIP-ACNC: <2 MG/DL
VIT C UR-MCNC: NEGATIVE MG/DL
WBC #/AREA URNS AUTO: 4 /HPF

## 2017-08-03 PROCEDURE — 59025 FETAL NON-STRESS TEST: CPT | Performed by: ADVANCED PRACTICE MIDWIFE

## 2017-08-03 RX ORDER — AMOXICILLIN 500 MG/1
500 CAPSULE ORAL 2 TIMES DAILY
Qty: 30 CAPSULE | Refills: 0 | Status: SHIPPED | OUTPATIENT
Start: 2017-08-03 | End: 2017-08-10

## 2017-08-03 NOTE — TELEPHONE ENCOUNTER
Pt c/o headaches, rates pain 6 out of 10 on pain scale. States tylenol helps a little but does not like taking tylenol. Denies blurry vision or vision changes. C/O swelling of feet and hands.  States she has been having swelling of feet for the past 2 wks b

## 2017-08-03 NOTE — TRIAGE
[facility]      Triage Note    Lalita Caitylyle Patient Status:  Observation    1990 MRN [id number]   Location [address] [facility] Attending Jey Ortez, [address] Day # 0 PCP None Pcp        Para:  Nonstress Test Interpretation: Reactive           Nonstress Test Second Interpretation: Reactive          FHR Category: Category I           Additional Comments       Reason for visit: Sent for evaluation of headache and swelling. Blood pressures WNL.  Head

## 2017-08-03 NOTE — PROGRESS NOTES
Patient evaluated in triage for headache and swelling. Normal blood pressures - UA neg protein; + large leukocytes. RX amoxicillin. Urine culture pending.

## 2017-08-05 ENCOUNTER — APPOINTMENT (OUTPATIENT)
Dept: LAB | Facility: HOSPITAL | Age: 27
End: 2017-08-05
Attending: ADVANCED PRACTICE MIDWIFE
Payer: COMMERCIAL

## 2017-08-05 ENCOUNTER — ROUTINE PRENATAL (OUTPATIENT)
Dept: OBGYN CLINIC | Facility: CLINIC | Age: 27
End: 2017-08-05

## 2017-08-05 VITALS
WEIGHT: 185.63 LBS | DIASTOLIC BLOOD PRESSURE: 80 MMHG | SYSTOLIC BLOOD PRESSURE: 125 MMHG | HEART RATE: 88 BPM | BODY MASS INDEX: 31 KG/M2

## 2017-08-05 DIAGNOSIS — Z34.03 ENCOUNTER FOR SUPERVISION OF NORMAL FIRST PREGNANCY IN THIRD TRIMESTER: Primary | ICD-10-CM

## 2017-08-05 DIAGNOSIS — L29.9 PRURITUS GRAVIDARUM, THIRD TRIMESTER: ICD-10-CM

## 2017-08-05 DIAGNOSIS — O99.713 PRURITUS GRAVIDARUM, THIRD TRIMESTER: ICD-10-CM

## 2017-08-05 LAB
ALT SERPL-CCNC: 13 U/L (ref 14–54)
APPEARANCE: CLEAR
AST SERPL-CCNC: 19 U/L (ref 15–41)
BILIRUBIN: NEGATIVE
ERYTHROCYTE [DISTWIDTH] IN BLOOD BY AUTOMATED COUNT: 12.2 % (ref 11–15)
HCT VFR BLD AUTO: 32.9 % (ref 35–48)
HGB BLD-MCNC: 11.2 G/DL (ref 12–16)
KETONES (URINE DIPSTICK): NEGATIVE MG/DL
MCH RBC QN AUTO: 28.7 PG (ref 27–32)
MCHC RBC AUTO-ENTMCNC: 33.9 G/DL (ref 32–37)
MCV RBC AUTO: 84.8 FL (ref 80–100)
MULTISTIX LOT#: NORMAL NUMERIC
NITRITE, URINE: NEGATIVE
OCCULT BLOOD: NEGATIVE
PH, URINE: 7 (ref 4.5–8)
PLATELET # BLD AUTO: 319 K/UL (ref 140–400)
PMV BLD AUTO: 8.2 FL (ref 7.4–10.3)
RBC # BLD AUTO: 3.88 M/UL (ref 3.7–5.4)
SPECIFIC GRAVITY: 1.01 (ref 1–1.03)
URINE-COLOR: YELLOW
UROBILINOGEN,SEMI-QN: 0.2 MG/DL (ref 0–1.9)
WBC # BLD AUTO: 10.5 K/UL (ref 4–11)

## 2017-08-05 PROCEDURE — 84460 ALANINE AMINO (ALT) (SGPT): CPT

## 2017-08-05 PROCEDURE — 85027 COMPLETE CBC AUTOMATED: CPT

## 2017-08-05 PROCEDURE — 36415 COLL VENOUS BLD VENIPUNCTURE: CPT

## 2017-08-05 PROCEDURE — 82239 BILE ACIDS TOTAL: CPT

## 2017-08-05 PROCEDURE — 84450 TRANSFERASE (AST) (SGOT): CPT

## 2017-08-05 NOTE — PROGRESS NOTES
Active fetus  BH contractions. Pt repots rash on arms, legs and abdomen that is pruritic. FM counts reinforced. Labs drawn. Warning signs reviewed  Labor S & S reviewed.   All questions answered

## 2017-08-07 ENCOUNTER — TELEPHONE (OUTPATIENT)
Dept: OBGYN CLINIC | Facility: CLINIC | Age: 27
End: 2017-08-07

## 2017-08-07 NOTE — TELEPHONE ENCOUNTER
Spoke to patient informed her per MBW urine culture was normal and ok to stop antibiotics, patient verbalized understanding.

## 2017-08-08 ENCOUNTER — TELEPHONE (OUTPATIENT)
Dept: OBGYN CLINIC | Facility: CLINIC | Age: 27
End: 2017-08-08

## 2017-08-08 LAB — BILE ACIDS, TOTAL: 5 UMOL/L

## 2017-08-08 NOTE — TELEPHONE ENCOUNTER
Lab called and states that pt's Bile Acid level is 5. Notified BR that [facility] ordered Bile acid, ALT and AST on 08/05 d/t pt c/o rash and itching on arms, legs and abd. Notified BR of pt's bile acid result. Notified of ALT and AST results.

## 2017-08-10 ENCOUNTER — TELEPHONE (OUTPATIENT)
Dept: OBGYN CLINIC | Facility: CLINIC | Age: 27
End: 2017-08-10

## 2017-08-10 NOTE — TELEPHONE ENCOUNTER
Notified pt of normal bile acids & MES recs. Pt still experiencing itching & has uri. Discussed symptom relief.  Pt verbalized an understanding & agrees w/ plan

## 2017-08-11 ENCOUNTER — TELEPHONE (OUTPATIENT)
Dept: OBGYN CLINIC | Facility: CLINIC | Age: 27
End: 2017-08-11

## 2017-08-11 NOTE — TELEPHONE ENCOUNTER
38 wks- pt has had a cold for about 5 days, and would like to know what she can take to make it better.

## 2017-08-11 NOTE — TELEPHONE ENCOUNTER
Pt has uri. Denies fever. Took benadryl last night w/o relief. Advised of listed meds on handout. Instructed to push po fluids. Contact pcp if symptoms worsen or fever develops.  Pt verbalzied an understanding & agrees w/ plan

## 2017-08-14 ENCOUNTER — ROUTINE PRENATAL (OUTPATIENT)
Dept: OBGYN CLINIC | Facility: CLINIC | Age: 27
End: 2017-08-14

## 2017-08-14 ENCOUNTER — HOSPITAL ENCOUNTER (OUTPATIENT)
Facility: HOSPITAL | Age: 27
Setting detail: OBSERVATION
Discharge: HOME | End: 2017-08-14
Attending: ADVANCED PRACTICE MIDWIFE | Admitting: OBSTETRICS & GYNECOLOGY
Payer: COMMERCIAL

## 2017-08-14 ENCOUNTER — TELEPHONE (OUTPATIENT)
Dept: OBGYN CLINIC | Facility: CLINIC | Age: 27
End: 2017-08-14

## 2017-08-14 VITALS — SYSTOLIC BLOOD PRESSURE: 124 MMHG | HEART RATE: 105 BPM | DIASTOLIC BLOOD PRESSURE: 82 MMHG

## 2017-08-14 VITALS
WEIGHT: 189 LBS | BODY MASS INDEX: 31 KG/M2 | SYSTOLIC BLOOD PRESSURE: 120 MMHG | DIASTOLIC BLOOD PRESSURE: 86 MMHG | HEART RATE: 87 BPM

## 2017-08-14 DIAGNOSIS — Z34.03 SUPERVISION OF LOW-RISK FIRST PREGNANCY, THIRD TRIMESTER: Primary | ICD-10-CM

## 2017-08-14 PROBLEM — O42.90 AMNIOTIC FLUID LEAKING: Status: ACTIVE | Noted: 2017-08-14

## 2017-08-14 PROBLEM — O42.90 AMNIOTIC FLUID LEAKING (HCC): Status: ACTIVE | Noted: 2017-08-14

## 2017-08-14 LAB
APPEARANCE: CLEAR
MULTISTIX LOT#: NORMAL NUMERIC
PH, URINE: 6 (ref 4.5–8)
SPECIFIC GRAVITY: 1.01 (ref 1–1.03)
URINE-COLOR: YELLOW
UROBILINOGEN,SEMI-QN: 0 MG/DL (ref 0–1.9)

## 2017-08-14 PROCEDURE — 59025 FETAL NON-STRESS TEST: CPT | Performed by: ADVANCED PRACTICE MIDWIFE

## 2017-08-14 NOTE — TRIAGE
[facility]      Triage Note    Waneta Haverford Patient Status:  Observation    1990 MRN [id number]   Location [address] [facility] Attending JEREL Jones   Hosp Day # 0 PCP None Pcp        Para:  Baseline: 130 BPM           Uterine Irritability: No           Contractions: Irregular                       Acoustic Stimulator: No           Nonstress Test Interpretation: Reactive                      FHR Category: Category I           Additional Commen

## 2017-08-14 NOTE — PROGRESS NOTES
Was having irregular contractions last night, today about every 15 min getting stronger. No LOF or bleeding. Baby active. Wondering about IOL as tired of being pregnant. Cervix 1.5/60/-2. Discussed no elective IOL before 39 wks.  Risk of failed IOL if cervi

## 2017-08-14 NOTE — TELEPHONE ENCOUNTER
Pt called labor line. Had appt at 0830 w/ MJ. Was feeling ctx q 15-20 mins then, now feeling them every 10 min lasting 45 secs. Pain scale 7/10. sve at appt 1cm & posterior per MJ. Pt states she has been leaking clear fluid since appt w/ increase in ctx.  D

## 2017-08-14 NOTE — PROGRESS NOTES
Received pt in triage with c/o leaking of fluids and stronger contractions since being checked today in the office at 0830.  Pt states she had started having contractions yesterday then went away at night and came back today that were every 20 minutes and a

## 2017-08-21 ENCOUNTER — ROUTINE PRENATAL (OUTPATIENT)
Dept: OBGYN CLINIC | Facility: CLINIC | Age: 27
End: 2017-08-21

## 2017-08-21 ENCOUNTER — HOSPITAL ENCOUNTER (INPATIENT)
Facility: HOSPITAL | Age: 27
LOS: 2 days | Discharge: HOME | End: 2017-08-24
Attending: ADVANCED PRACTICE MIDWIFE | Admitting: OBSTETRICS & GYNECOLOGY
Payer: COMMERCIAL

## 2017-08-21 ENCOUNTER — TELEPHONE (OUTPATIENT)
Dept: OBGYN CLINIC | Facility: CLINIC | Age: 27
End: 2017-08-21

## 2017-08-21 VITALS
DIASTOLIC BLOOD PRESSURE: 87 MMHG | WEIGHT: 195.81 LBS | BODY MASS INDEX: 33 KG/M2 | HEART RATE: 84 BPM | SYSTOLIC BLOOD PRESSURE: 134 MMHG

## 2017-08-21 DIAGNOSIS — Z34.03 ENCOUNTER FOR SUPERVISION OF NORMAL FIRST PREGNANCY IN THIRD TRIMESTER: Primary | ICD-10-CM

## 2017-08-21 DIAGNOSIS — R52 PAIN DURING LABOR: Primary | ICD-10-CM

## 2017-08-21 LAB
MULTISTIX LOT#: NORMAL NUMERIC
PH, URINE: 6.5 (ref 4.5–8)
SPECIFIC GRAVITY: 1.01 (ref 1–1.03)
URINE-COLOR: YELLOW
UROBILINOGEN,SEMI-QN: 0.2 MG/DL (ref 0–1.9)

## 2017-08-22 ENCOUNTER — ANESTHESIA EVENT (OUTPATIENT)
Dept: OBGYN UNIT | Facility: HOSPITAL | Age: 27
End: 2017-08-22
Payer: COMMERCIAL

## 2017-08-22 ENCOUNTER — ANESTHESIA (OUTPATIENT)
Dept: OBGYN UNIT | Facility: HOSPITAL | Age: 27
End: 2017-08-22
Payer: COMMERCIAL

## 2017-08-22 PROBLEM — Z34.03 NORMAL FIRST PREGNANCY CONFIRMED, CURRENTLY IN THIRD TRIMESTER: Status: ACTIVE | Noted: 2017-08-22

## 2017-08-22 PROBLEM — Z34.03: Status: ACTIVE | Noted: 2017-08-22

## 2017-08-22 PROBLEM — Z34.00 SUPERVISION OF NORMAL FIRST PREGNANCY, ANTEPARTUM (HCC): Status: ACTIVE | Noted: 2017-08-22

## 2017-08-22 PROBLEM — O43.123 VELAMENTOUS INSERTION OF UMBILICAL CORD IN THIRD TRIMESTER (HCC): Status: ACTIVE | Noted: 2017-08-22

## 2017-08-22 PROBLEM — Z34.90 NORMAL PREGNANCY (HCC): Status: ACTIVE | Noted: 2017-08-22

## 2017-08-22 PROBLEM — O43.123 VELAMENTOUS INSERTION OF UMBILICAL CORD IN THIRD TRIMESTER: Status: ACTIVE | Noted: 2017-08-22

## 2017-08-22 PROBLEM — Z34.90 NORMAL PREGNANCY: Status: ACTIVE | Noted: 2017-08-22

## 2017-08-22 PROBLEM — Z34.00 SUPERVISION OF NORMAL FIRST PREGNANCY, ANTEPARTUM: Status: ACTIVE | Noted: 2017-08-22

## 2017-08-22 LAB
ANTIBODY SCREEN: NEGATIVE
ERYTHROCYTE [DISTWIDTH] IN BLOOD BY AUTOMATED COUNT: 13 % (ref 11–15)
HCT VFR BLD AUTO: 33.7 % (ref 35–48)
HGB BLD-MCNC: 11 G/DL (ref 12–16)
MCH RBC QN AUTO: 27.4 PG (ref 27–32)
MCHC RBC AUTO-ENTMCNC: 32.7 G/DL (ref 32–37)
MCV RBC AUTO: 83.9 FL (ref 80–100)
PLATELET # BLD AUTO: 354 K/UL (ref 140–400)
PMV BLD AUTO: 8.7 FL (ref 7.4–10.3)
RBC # BLD AUTO: 4.01 M/UL (ref 3.7–5.4)
RH BLOOD TYPE: POSITIVE
WBC # BLD AUTO: 15.8 K/UL (ref 4–11)

## 2017-08-22 PROCEDURE — 59400 OBSTETRICAL CARE: CPT | Performed by: ADVANCED PRACTICE MIDWIFE

## 2017-08-22 PROCEDURE — 4A1H74Z MONITORING OF PRODUCTS OF CONCEPTION, CARDIAC ELECTRICAL ACTIVITY, VIA NATURAL OR ARTIFICIAL OPENING: ICD-10-PCS | Performed by: ADVANCED PRACTICE MIDWIFE

## 2017-08-22 RX ORDER — LIDOCAINE HYDROCHLORIDE AND EPINEPHRINE 20; 5 MG/ML; UG/ML
INJECTION, SOLUTION EPIDURAL; INFILTRATION; INTRACAUDAL; PERINEURAL
Status: DISCONTINUED
Start: 2017-08-22 | End: 2017-08-22 | Stop reason: WASHOUT

## 2017-08-22 RX ORDER — LIDOCAINE HYDROCHLORIDE 10 MG/ML
INJECTION, SOLUTION EPIDURAL; INFILTRATION; INTRACAUDAL; PERINEURAL AS NEEDED
Status: DISCONTINUED | OUTPATIENT
Start: 2017-08-22 | End: 2017-08-22 | Stop reason: SURG

## 2017-08-22 RX ORDER — DEXTROSE, SODIUM CHLORIDE, SODIUM LACTATE, POTASSIUM CHLORIDE, AND CALCIUM CHLORIDE 5; .6; .31; .03; .02 G/100ML; G/100ML; G/100ML; G/100ML; G/100ML
125 INJECTION, SOLUTION INTRAVENOUS CONTINUOUS
Status: DISCONTINUED | OUTPATIENT
Start: 2017-08-22 | End: 2017-08-22 | Stop reason: HOSPADM

## 2017-08-22 RX ORDER — AMMONIA INHALANTS 0.04 G/.3ML
0.3 INHALANT RESPIRATORY (INHALATION) AS NEEDED
Status: DISCONTINUED | OUTPATIENT
Start: 2017-08-22 | End: 2017-08-24

## 2017-08-22 RX ORDER — AMMONIA INHALANTS 0.04 G/.3ML
0.3 INHALANT RESPIRATORY (INHALATION) AS NEEDED
Status: DISCONTINUED | OUTPATIENT
Start: 2017-08-22 | End: 2017-08-22 | Stop reason: HOSPADM

## 2017-08-22 RX ORDER — SODIUM CHLORIDE, SODIUM LACTATE, POTASSIUM CHLORIDE, CALCIUM CHLORIDE 600; 310; 30; 20 MG/100ML; MG/100ML; MG/100ML; MG/100ML
INJECTION, SOLUTION INTRAVENOUS
Status: COMPLETED
Start: 2017-08-22 | End: 2017-08-22

## 2017-08-22 RX ORDER — 0.9 % SODIUM CHLORIDE 0.9 %
VIAL (ML) INJECTION
Status: DISPENSED
Start: 2017-08-22 | End: 2017-08-22

## 2017-08-22 RX ORDER — LIDOCAINE HYDROCHLORIDE AND EPINEPHRINE 15; 5 MG/ML; UG/ML
INJECTION, SOLUTION EPIDURAL AS NEEDED
Status: DISCONTINUED | OUTPATIENT
Start: 2017-08-22 | End: 2017-08-22 | Stop reason: SURG

## 2017-08-22 RX ORDER — TERBUTALINE SULFATE 1 MG/ML
0.25 INJECTION, SOLUTION SUBCUTANEOUS AS NEEDED
Status: DISCONTINUED | OUTPATIENT
Start: 2017-08-22 | End: 2017-08-22 | Stop reason: HOSPADM

## 2017-08-22 RX ORDER — TRISODIUM CITRATE DIHYDRATE AND CITRIC ACID MONOHYDRATE 500; 334 MG/5ML; MG/5ML
30 SOLUTION ORAL AS NEEDED
Status: DISCONTINUED | OUTPATIENT
Start: 2017-08-22 | End: 2017-08-22 | Stop reason: HOSPADM

## 2017-08-22 RX ORDER — EPHEDRINE SULFATE/0.9% NACL/PF 25 MG/5 ML
5 SYRINGE (ML) INTRAVENOUS AS NEEDED
Status: DISCONTINUED | OUTPATIENT
Start: 2017-08-22 | End: 2017-08-22

## 2017-08-22 RX ORDER — SODIUM CHLORIDE 0.9 % (FLUSH) 0.9 %
10 SYRINGE (ML) INJECTION AS NEEDED
Status: DISCONTINUED | OUTPATIENT
Start: 2017-08-22 | End: 2017-08-22 | Stop reason: HOSPADM

## 2017-08-22 RX ORDER — LIDOCAINE HYDROCHLORIDE 10 MG/ML
30 INJECTION, SOLUTION EPIDURAL; INFILTRATION; INTRACAUDAL; PERINEURAL ONCE
Status: DISCONTINUED | OUTPATIENT
Start: 2017-08-22 | End: 2017-08-22 | Stop reason: HOSPADM

## 2017-08-22 RX ORDER — BISACODYL 10 MG
10 SUPPOSITORY, RECTAL RECTAL ONCE AS NEEDED
Status: DISCONTINUED | OUTPATIENT
Start: 2017-08-22 | End: 2017-08-24

## 2017-08-22 RX ORDER — EPHEDRINE SULFATE/0.9% NACL/PF 25 MG/5 ML
SYRINGE (ML) INTRAVENOUS
Status: DISCONTINUED
Start: 2017-08-22 | End: 2017-08-22 | Stop reason: WASHOUT

## 2017-08-22 RX ORDER — BUPIVACAINE HYDROCHLORIDE 2.5 MG/ML
INJECTION, SOLUTION EPIDURAL; INFILTRATION; INTRACAUDAL
Status: DISCONTINUED
Start: 2017-08-22 | End: 2017-08-22 | Stop reason: WASHOUT

## 2017-08-22 RX ORDER — SIMETHICONE 80 MG
80 TABLET,CHEWABLE ORAL 3 TIMES DAILY PRN
Status: DISCONTINUED | OUTPATIENT
Start: 2017-08-22 | End: 2017-08-24

## 2017-08-22 RX ORDER — DOCUSATE SODIUM 100 MG/1
100 CAPSULE, LIQUID FILLED ORAL 2 TIMES DAILY
Status: DISCONTINUED | OUTPATIENT
Start: 2017-08-22 | End: 2017-08-24

## 2017-08-22 RX ORDER — NALBUPHINE HCL 10 MG/ML
2.5 AMPUL (ML) INJECTION
Status: DISCONTINUED | OUTPATIENT
Start: 2017-08-22 | End: 2017-08-24

## 2017-08-22 RX ORDER — PHENYLEPHRINE HCL IN 0.9% NACL 0.5 MG/5ML
SYRINGE (ML) INTRAVENOUS
Status: DISCONTINUED
Start: 2017-08-22 | End: 2017-08-22 | Stop reason: WASHOUT

## 2017-08-22 RX ORDER — ONDANSETRON 2 MG/ML
4 INJECTION INTRAMUSCULAR; INTRAVENOUS EVERY 6 HOURS PRN
Status: DISCONTINUED | OUTPATIENT
Start: 2017-08-22 | End: 2017-08-24

## 2017-08-22 RX ORDER — IBUPROFEN 600 MG/1
600 TABLET ORAL ONCE AS NEEDED
Status: DISCONTINUED | OUTPATIENT
Start: 2017-08-22 | End: 2017-08-24

## 2017-08-22 RX ORDER — DIAPER,BRIEF,INFANT-TODD,DISP
1 EACH MISCELLANEOUS EVERY 6 HOURS PRN
Status: DISCONTINUED | OUTPATIENT
Start: 2017-08-22 | End: 2017-08-24

## 2017-08-22 RX ORDER — SODIUM CHLORIDE, SODIUM LACTATE, POTASSIUM CHLORIDE, CALCIUM CHLORIDE 600; 310; 30; 20 MG/100ML; MG/100ML; MG/100ML; MG/100ML
INJECTION, SOLUTION INTRAVENOUS
Status: DISPENSED
Start: 2017-08-22 | End: 2017-08-22

## 2017-08-22 RX ORDER — SODIUM CHLORIDE 0.9 % (FLUSH) 0.9 %
10 SYRINGE (ML) INJECTION AS NEEDED
Status: DISCONTINUED | OUTPATIENT
Start: 2017-08-22 | End: 2017-08-24

## 2017-08-22 RX ORDER — PRENATAL VIT,CAL 76/IRON/FOLIC 29 MG-1 MG
1 TABLET ORAL DAILY
Status: DISCONTINUED | OUTPATIENT
Start: 2017-08-22 | End: 2017-08-24

## 2017-08-22 RX ADMIN — LIDOCAINE HYDROCHLORIDE 3 ML: 10 INJECTION, SOLUTION EPIDURAL; INFILTRATION; INTRACAUDAL; PERINEURAL at 02:21:00

## 2017-08-22 RX ADMIN — LIDOCAINE HYDROCHLORIDE AND EPINEPHRINE 3 ML: 15; 5 INJECTION, SOLUTION EPIDURAL at 02:25:00

## 2017-08-22 NOTE — ANESTHESIA POSTPROCEDURE EVALUATION
Patient: Julia Becerra    Procedure Summary     Date:  08/22/17 Room / Location:      Anesthesia Start:  [address] [facility] Anesthesia Stop:  1922    Procedure:  LABOR ANALGESIA Diagnosis:      Scheduled Providers:   Anesthesiologist:  MD Phillip Jaquez

## 2017-08-22 NOTE — LACTATION NOTE
LACTATION NOTE - MOTHER      Evaluation Type: Inpatient    Problems identified  Problems identified: Knowledge deficit    Maternal history  Maternal history: Anxiety;Depression  Other/comment: ADD     Breastfeeding goal  Breastfeeding goal:  (mom changed m

## 2017-08-22 NOTE — L&D DELIVERY NOTE
Atif Peraza  [[id number]]     Labor Events     labor?:  No   Antibiotics received during labor?:  No   Rupture date:  17   Rupture time:  2350   Rupture type:  SROM   Fluid color:  Clear   Induction:  None   Intrapartum & labor complications: Scoring Key:     0 1 2    Skin color Blue or pale Acrocyanotic Completely pink    Heart rate Absent <100 bpm >100 bpm    Reflex irritability No response Grimace Cry or active withdrawal    Muscle tone Limp Some flexion Active motion    Respiratory effort A

## 2017-08-22 NOTE — ANESTHESIA PREPROCEDURE EVALUATION
Anesthesia PreOp Note    HPI:     Iván Magana is a 24year old female who presents for preoperative consultation requested by: * No surgeons listed *    Date of Surgery: 8/22/2017    * No procedures listed *  Indication: * No pre-op diagnosis entered * Continuous Coni Cronin CNM     Lidocaine HCl (PF) (XYLOCAINE) 1 % injection SOLN 30 mL 30 mL Intradermal Once Coni Cronin CNM     ibuprofen (MOTRIN) tab 600 mg 600 mg Oral Once PRN Roseanne Kaplan CNM     oxyTOCIN (PITOCIN) 30 units/ 500 m Allergies    Family History   Problem Relation Age of Onset   • Other Isa Lunger Mother 39     hysterectomy    • Hypertension Maternal Grandfather    • Diabetes Maternal Grandfather      Type II       Social History  Social History   Marital status: Single  S Plan:   Epidural  Informed Consent Plan and Risks Discussed With:  Patient and spouse      I have informed Isabelle Hunter  of the nature of the anesthetic plan, benefits, risks, major complications, and any alternative forms of anesthetic management.    Al

## 2017-08-22 NOTE — PROGRESS NOTES
Active baby. Discussed IOL at 41 weeks. Patient to have NST / CNM visit later this week. Schedule for IOL at 39+6 for cervical ripening.

## 2017-08-22 NOTE — TELEPHONE ENCOUNTER
Patient had exam in the office today with membrane sweep believes contractions are now active labor started approximately 2 hours ago. Reports she is coming in to [facility] for evaluation. Reiterated likely discharge if not 4-5cm dilated.

## 2017-08-22 NOTE — H&P
1131 Gail Argueta Patient Status:  Inpatient    1990 MRN [id number]   Location [address] [facility] Attending [facility]  Day # 0 Sean Ling MD     Date of Unknown time   Sertraline HCl 50 MG Oral Tab Take 1 tablet (50 mg total) by mouth every morning.  Disp: 90 tablet Rfl: 0 More than a month at Unknown time       Review of Systems:   As documented in HPI    leaking of clear fluid  since 12am    Physical Exam 08/03/2017            Admit labs are pending. Reviewed all prenatal ultrasounds.      Assessment/Plan:    Darion Gary is at an estimated gestational age of 37w0d with an estimated date of delivery of:  8/22/2017, by Ultrasound    SROM  Obstetrical histo

## 2017-08-22 NOTE — ANESTHESIA PROCEDURE NOTES
Labor Analgesia  Performed by: Adelfo Schofield  Authorized by: Adelfo Schofield     Patient Location:  OB  Start Time:  8/22/2017 2:18 AM  End Time:  8/22/2017 2:33 AM  Reason for Block: labor epidural    Anesthesiologist:  Adelfo Schofield  Perform

## 2017-08-22 NOTE — PROGRESS NOTES
[facility]    Labor Progress Note    Bridget Reddy Patient Status:  Inpatient    1990 MRN [id number]   Location [address] [facility] Attending Denis Almanza, [address] [facility] Day # 0 PCP None Pcp       Subjective   In trimester of pregnancy      ADHD (attention deficit hyperactivity disorder)      Amniotic fluid leaking      Normal pregnancy      Supervision of normal first pregnancy, antepartum  CAT I fhts; epidural as desired.  Expectant management     Plan discussed w

## 2017-08-23 LAB
ERYTHROCYTE [DISTWIDTH] IN BLOOD BY AUTOMATED COUNT: 13.2 % (ref 11–15)
HCT VFR BLD AUTO: 27.1 % (ref 35–48)
HGB BLD-MCNC: 9.1 G/DL (ref 12–16)
MCH RBC QN AUTO: 28.1 PG (ref 27–32)
MCHC RBC AUTO-ENTMCNC: 33.4 G/DL (ref 32–37)
MCV RBC AUTO: 84.1 FL (ref 80–100)
PLATELET # BLD AUTO: 261 K/UL (ref 140–400)
PMV BLD AUTO: 7.9 FL (ref 7.4–10.3)
RBC # BLD AUTO: 3.22 M/UL (ref 3.7–5.4)
T PALLIDUM AB SER QL: NEGATIVE
WBC # BLD AUTO: 11.9 K/UL (ref 4–11)

## 2017-08-23 PROCEDURE — 99232 SBSQ HOSP IP/OBS MODERATE 35: CPT | Performed by: ADVANCED PRACTICE MIDWIFE

## 2017-08-23 RX ORDER — MELATONIN
325
Status: DISCONTINUED | OUTPATIENT
Start: 2017-08-23 | End: 2017-08-24

## 2017-08-23 NOTE — PROGRESS NOTES
[place] [facility]    OB/GYNE Progress Note      Max Poor Patient Status:  Inpatient    1990 MRN [id number]   Location [facility] 3SE Attending Abiola Prince, BERNADETTE   Hosp Day # 1 PCP None Pcp       Assessment/Plan          AD Catheter Information  Does patient have a oliveros catheter: no  Has the oliveros catheter been removed: Not Applicable      Results:     Lab Results  Component Value Date   TREPONEMALAB Negative 08/22/2017   HBVSAG Nonreactive  03/11/2017   ABO O 08/22/2017   SHUBHAM

## 2017-08-23 NOTE — PROGRESS NOTES
SPOKE WITH Noy Lilly at  regarding PP score of 9 with a history of depression, anxiety and ADHD and previous use of medications prescribed my psych.  Pt has her [facility] psychiatrist and anticipates resuming medications once home and seeing her own

## 2017-08-23 NOTE — LACTATION NOTE
Parents still discussing whether or not mom should breastfeed. Advised that a decision is important to establishment of milk supply. Mom will call if she wants to put infant to the breast or use a pump.

## 2017-08-24 VITALS
RESPIRATION RATE: 16 BRPM | TEMPERATURE: 98 F | OXYGEN SATURATION: 99 % | SYSTOLIC BLOOD PRESSURE: 135 MMHG | HEART RATE: 69 BPM | DIASTOLIC BLOOD PRESSURE: 78 MMHG

## 2017-08-24 PROBLEM — Z34.00 SUPERVISION OF NORMAL FIRST PREGNANCY, ANTEPARTUM: Status: RESOLVED | Noted: 2017-08-22 | Resolved: 2017-08-24

## 2017-08-24 PROBLEM — Z34.90 NORMAL PREGNANCY (HCC): Status: RESOLVED | Noted: 2017-08-22 | Resolved: 2017-08-24

## 2017-08-24 PROBLEM — O09.891 MEDICATION EXPOSURE DURING FIRST TRIMESTER OF PREGNANCY: Status: RESOLVED | Noted: 2017-01-06 | Resolved: 2017-08-24

## 2017-08-24 PROBLEM — Z34.03 NORMAL FIRST PREGNANCY CONFIRMED, CURRENTLY IN THIRD TRIMESTER: Status: RESOLVED | Noted: 2017-08-22 | Resolved: 2017-08-24

## 2017-08-24 PROBLEM — Z34.90 NORMAL PREGNANCY: Status: RESOLVED | Noted: 2017-08-22 | Resolved: 2017-08-24

## 2017-08-24 PROBLEM — Z34.03: Status: RESOLVED | Noted: 2017-08-22 | Resolved: 2017-08-24

## 2017-08-24 PROBLEM — O42.90 AMNIOTIC FLUID LEAKING (HCC): Status: RESOLVED | Noted: 2017-08-14 | Resolved: 2017-08-24

## 2017-08-24 PROBLEM — Z34.00 SUPERVISION OF NORMAL FIRST PREGNANCY, ANTEPARTUM (HCC): Status: RESOLVED | Noted: 2017-08-22 | Resolved: 2017-08-24

## 2017-08-24 PROBLEM — O09.891 MEDICATION EXPOSURE DURING FIRST TRIMESTER OF PREGNANCY (HCC): Status: RESOLVED | Noted: 2017-01-06 | Resolved: 2017-08-24

## 2017-08-24 PROBLEM — O42.90 AMNIOTIC FLUID LEAKING: Status: RESOLVED | Noted: 2017-08-14 | Resolved: 2017-08-24

## 2017-08-24 LAB
ERYTHROCYTE [DISTWIDTH] IN BLOOD BY AUTOMATED COUNT: 13.3 % (ref 11–15)
HCT VFR BLD AUTO: 27.4 % (ref 35–48)
HGB BLD-MCNC: 9 G/DL (ref 12–16)
MCH RBC QN AUTO: 27.8 PG (ref 27–32)
MCHC RBC AUTO-ENTMCNC: 32.8 G/DL (ref 32–37)
MCV RBC AUTO: 84.8 FL (ref 80–100)
PLATELET # BLD AUTO: 288 K/UL (ref 140–400)
PMV BLD AUTO: 7.5 FL (ref 7.4–10.3)
RBC # BLD AUTO: 3.22 M/UL (ref 3.7–5.4)
WBC # BLD AUTO: 9.9 K/UL (ref 4–11)

## 2017-08-24 NOTE — DISCHARGE SUMMARY
[place] [facility] - [facility]    Discharge Summary    Vitlay Lee Patient Status:  Inpatient    1990 MRN [id number]   Location [facility] 3SE Attending Misael Blanchard, [address] Day # 2       Delivering OB Clinician: Cathy Avitia

## 2017-09-06 ENCOUNTER — TELEPHONE (OUTPATIENT)
Dept: OBGYN CLINIC | Facility: CLINIC | Age: 27
End: 2017-09-06

## 2017-09-06 ENCOUNTER — POSTPARTUM (OUTPATIENT)
Dept: OBGYN CLINIC | Facility: CLINIC | Age: 27
End: 2017-09-06

## 2017-09-06 VITALS
HEART RATE: 77 BPM | SYSTOLIC BLOOD PRESSURE: 135 MMHG | BODY MASS INDEX: 27 KG/M2 | WEIGHT: 161 LBS | DIASTOLIC BLOOD PRESSURE: 88 MMHG

## 2017-09-06 DIAGNOSIS — K60.4 RECTAL FISTULA: ICD-10-CM

## 2017-09-06 PROCEDURE — 99213 OFFICE O/P EST LOW 20 MIN: CPT | Performed by: ADVANCED PRACTICE MIDWIFE

## 2017-09-06 RX ORDER — METRONIDAZOLE 500 MG/1
500 TABLET ORAL 2 TIMES DAILY
Qty: 14 TABLET | Refills: 0 | Status: SHIPPED | OUTPATIENT
Start: 2017-09-06 | End: 2017-09-13

## 2017-09-06 NOTE — TELEPHONE ENCOUNTER
Information given to pt for her appt on 9/8/17 at [facility] with Dr Zora Mead. Pt agrees with plan.

## 2017-09-06 NOTE — TELEPHONE ENCOUNTER
Pt states she will call back in 5 min. Pt is to be advised her appt has been scheduled with Dr Windy Melendez, [place]-Rectal Surgeon at [facility]. Pt's appt is scheduled for Friday, 9/8/17 at [address] [facility] in [facility]. , [address] [facility], [address] .   Pt

## 2017-09-06 NOTE — PROGRESS NOTES
Patient here for postpartum check-up. Vaginal delivery @ 2 weeks ago with BERNADETTE SPENCER; complicated by shoulder, epis, fetal intolerance to 2nd stage. Bottlefeeding. Baby with adequate weight gain. Denies fevers, chills, body aches and flu-like symptoms.   Bridget Parra

## 2017-09-08 ENCOUNTER — TELEPHONE (OUTPATIENT)
Dept: OBGYN CLINIC | Facility: CLINIC | Age: 27
End: 2017-09-08

## 2017-09-08 LAB
GENITAL VAGINOSIS SCREEN: NEGATIVE
TRICHOMONAS SCREEN: NEGATIVE

## 2017-10-04 ENCOUNTER — POSTPARTUM (OUTPATIENT)
Dept: OBGYN CLINIC | Facility: CLINIC | Age: 27
End: 2017-10-04

## 2017-10-04 VITALS
BODY MASS INDEX: 24.43 KG/M2 | HEART RATE: 66 BPM | WEIGHT: 152 LBS | HEIGHT: 66 IN | DIASTOLIC BLOOD PRESSURE: 81 MMHG | SYSTOLIC BLOOD PRESSURE: 131 MMHG

## 2017-10-04 DIAGNOSIS — Z32.02 PREGNANCY EXAMINATION OR TEST, NEGATIVE RESULT: Primary | ICD-10-CM

## 2017-10-04 PROCEDURE — 81025 URINE PREGNANCY TEST: CPT | Performed by: ADVANCED PRACTICE MIDWIFE

## 2017-10-04 RX ORDER — DROSPIRENONE AND ETHINYL ESTRADIOL 0.02-3(28)
1 KIT ORAL DAILY
Qty: 1 PACKAGE | Refills: 11 | Status: SHIPPED | OUTPATIENT
Start: 2017-10-04 | End: 2017-10-20

## 2017-10-04 NOTE — PROGRESS NOTES
Patient here for postpartum check-up. Vaginal delivery @ 6 weeks ago with BERNADETTE tim complicated by shoulder dystocia, Episiotomy. Has seen colorectal surgeon for evaluation - expectant management. Not having any problems with it. Bottlefeeding only.  Bab

## 2017-10-20 ENCOUNTER — TELEPHONE (OUTPATIENT)
Dept: OBGYN CLINIC | Facility: CLINIC | Age: 27
End: 2017-10-20

## 2017-10-20 ENCOUNTER — OFFICE VISIT (OUTPATIENT)
Dept: INTERNAL MEDICINE CLINIC | Facility: CLINIC | Age: 27
End: 2017-10-20

## 2017-10-20 ENCOUNTER — NURSE TRIAGE (OUTPATIENT)
Dept: OTHER | Age: 27
End: 2017-10-20

## 2017-10-20 VITALS
DIASTOLIC BLOOD PRESSURE: 78 MMHG | TEMPERATURE: 98 F | BODY MASS INDEX: 24.91 KG/M2 | HEART RATE: 67 BPM | WEIGHT: 155 LBS | HEIGHT: 66 IN | SYSTOLIC BLOOD PRESSURE: 114 MMHG

## 2017-10-20 DIAGNOSIS — H81.10 BENIGN PAROXYSMAL POSITIONAL VERTIGO, UNSPECIFIED LATERALITY: Primary | ICD-10-CM

## 2017-10-20 PROCEDURE — 99203 OFFICE O/P NEW LOW 30 MIN: CPT | Performed by: INTERNAL MEDICINE

## 2017-10-20 RX ORDER — DROSPIRENONE AND ETHINYL ESTRADIOL 0.02-3(28)
1 KIT ORAL DAILY
Qty: 3 PACKAGE | Refills: 3 | Status: SHIPPED | OUTPATIENT
Start: 2017-10-20 | End: 2018-04-05

## 2017-10-20 NOTE — PROGRESS NOTES
Alvina Guajardo is a 24year old female. Patient presents with:  Dizziness: when standing up/going to sit, but also when resting. past 2 weeks. 6 weeks post partum   Blurry Vision  Headache: on/off      HPI:   Here for dizziness x2 weeks.   Has sporadic epis History:  Smoking status: Never Smoker                                                              Smokeless tobacco: Never Used                      Alcohol use: Yes           1.2 oz/week     Cans of beer: 2 per week     Comment: occ       REVIEW OF SYST

## 2017-10-20 NOTE — TELEPHONE ENCOUNTER
Reason for Disposition  • Patient wants to be seen    Protocols used: DIZZINESS-A-OH  Action Requested: Summary for Provider     []  Critical Lab, Recommendations Needed  [] Need Additional Advice  []   FYI    []   Need Orders  [] Need Medications Sent t

## 2017-11-10 ENCOUNTER — TELEPHONE (OUTPATIENT)
Dept: OBGYN CLINIC | Facility: CLINIC | Age: 27
End: 2017-11-10

## 2017-11-10 PROBLEM — R51.9 HEADACHE: Status: RESOLVED | Noted: 2017-08-03 | Resolved: 2017-11-10

## 2017-11-10 PROBLEM — L29.9 PRURITUS: Status: RESOLVED | Noted: 2017-08-05 | Resolved: 2017-11-10

## 2017-11-10 PROBLEM — N82.3 RECTOVAGINAL FISTULA: Status: ACTIVE | Noted: 2017-11-10

## 2017-11-10 NOTE — TELEPHONE ENCOUNTER
Spoke with pt who states she did not have a question on bcp. Pt reports today she started having vaginal bleeding. Pt states it is not her period since her period ended 3 days ago. Pt states she placed a pantiliner this morning and has not soaked it.  Pt re

## 2017-11-11 ENCOUNTER — OFFICE VISIT (OUTPATIENT)
Dept: OBGYN CLINIC | Facility: CLINIC | Age: 27
End: 2017-11-11

## 2017-11-11 VITALS
DIASTOLIC BLOOD PRESSURE: 79 MMHG | WEIGHT: 150 LBS | BODY MASS INDEX: 24 KG/M2 | HEART RATE: 62 BPM | SYSTOLIC BLOOD PRESSURE: 120 MMHG

## 2017-11-11 DIAGNOSIS — N82.3 RECTO-VAGINAL FISTULA: ICD-10-CM

## 2017-11-11 DIAGNOSIS — N93.9 VAGINAL BLEEDING: Primary | ICD-10-CM

## 2017-11-11 PROCEDURE — 99213 OFFICE O/P EST LOW 20 MIN: CPT | Performed by: ADVANCED PRACTICE MIDWIFE

## 2017-11-11 NOTE — TELEPHONE ENCOUNTER
Pt states having 1st period since birth of baby 10wks ago. Started ocp several weeks ago. Not breastfdg. States having bright red bleeding x2 days, requiring her to change pad hourly. Having shooting pelvic pain & cramping.  Feels that her epis has come apa

## 2017-11-11 NOTE — PROGRESS NOTES
HPI:    Patient ID: Yaritza Aaron is a 24year old female who presents with heavy menstrual like bleeding. Pt had first menses since birth of child 2 weeks ago it was normal flow then 7 days later started menstruating again.   Pts PP period has been compli There is no rash, tenderness, lesion or injury on the right labia. There is no rash, tenderness, lesion or injury on the left labia. Cervix exhibits no motion tenderness. There is bleeding in the vagina. No erythema or tenderness in the vagina.  No vaginal

## 2017-11-13 ENCOUNTER — TELEPHONE (OUTPATIENT)
Dept: OBGYN CLINIC | Facility: CLINIC | Age: 27
End: 2017-11-13

## 2017-11-14 NOTE — TELEPHONE ENCOUNTER
Per MBW letter can be created. LM for pt notifying letter was created and sent to [employer], copy also mailed to her home.

## 2018-01-31 ENCOUNTER — HOSPITAL ENCOUNTER (OUTPATIENT)
Age: 28
Discharge: HOME | End: 2018-01-31
Payer: COMMERCIAL

## 2018-01-31 VITALS
OXYGEN SATURATION: 100 % | TEMPERATURE: 99 F | HEIGHT: 65 IN | WEIGHT: 135 LBS | HEART RATE: 100 BPM | BODY MASS INDEX: 22.49 KG/M2 | RESPIRATION RATE: 20 BRPM | SYSTOLIC BLOOD PRESSURE: 118 MMHG | DIASTOLIC BLOOD PRESSURE: 75 MMHG

## 2018-01-31 DIAGNOSIS — J40 BRONCHITIS: Primary | ICD-10-CM

## 2018-01-31 LAB — S PYO AG THROAT QL: NEGATIVE

## 2018-01-31 PROCEDURE — 99214 OFFICE O/P EST MOD 30 MIN: CPT

## 2018-01-31 PROCEDURE — 87430 STREP A AG IA: CPT

## 2018-01-31 PROCEDURE — 99213 OFFICE O/P EST LOW 20 MIN: CPT

## 2018-01-31 RX ORDER — AZITHROMYCIN 250 MG/1
TABLET, FILM COATED ORAL
Qty: 1 PACKAGE | Refills: 0 | Status: SHIPPED | OUTPATIENT
Start: 2018-01-31 | End: 2018-02-05

## 2018-01-31 RX ORDER — BENZONATATE 100 MG/1
200 CAPSULE ORAL 3 TIMES DAILY PRN
Qty: 30 CAPSULE | Refills: 0 | Status: SHIPPED | OUTPATIENT
Start: 2018-01-31 | End: 2018-04-05

## 2018-01-31 RX ORDER — ALBUTEROL SULFATE 90 UG/1
2 AEROSOL, METERED RESPIRATORY (INHALATION) EVERY 4 HOURS PRN
Qty: 1 INHALER | Refills: 0 | Status: SHIPPED | OUTPATIENT
Start: 2018-01-31 | End: 2018-03-02

## 2018-01-31 NOTE — ED PROVIDER NOTES
Patient presents with:  Sore Throat      HPI:     Ayse Aguilar is a 24year old female who presents for evaluation and management of a chief complaint of tactile fevers, nasal congestion, harsh cough that is nonproductive, and body aches for the past 5 da (5' 5\")   Wt 61.2 kg   LMP 01/25/2018 (Approximate)   SpO2 100%   BMI 22.47 kg/m²   GENERAL: well developed, well nourished, well hydrated, no distress  SKIN: good skin turgor, no obvious rashes  NECK: supple, no adenopathy. No meningismus.   CARDIO: RRR w ICD-10-CM    1. Bronchitis J40        All results reviewed and discussed with patient. See AVS for detailed discharge instructions for your condition today.     Follow Up with:  Marco A Feliciano DO  [address] [facility] [address] [facility]

## 2018-02-03 ENCOUNTER — TELEPHONE (OUTPATIENT)
Dept: OBGYN CLINIC | Facility: CLINIC | Age: 28
End: 2018-02-03

## 2018-04-05 ENCOUNTER — TELEPHONE (OUTPATIENT)
Dept: OBGYN CLINIC | Facility: CLINIC | Age: 28
End: 2018-04-05

## 2018-04-05 ENCOUNTER — OFFICE VISIT (OUTPATIENT)
Dept: FAMILY MEDICINE CLINIC | Facility: CLINIC | Age: 28
End: 2018-04-05

## 2018-04-05 VITALS
BODY MASS INDEX: 19.61 KG/M2 | WEIGHT: 122 LBS | DIASTOLIC BLOOD PRESSURE: 80 MMHG | SYSTOLIC BLOOD PRESSURE: 119 MMHG | HEART RATE: 80 BPM | HEIGHT: 66 IN

## 2018-04-05 DIAGNOSIS — R42 DIZZINESS: ICD-10-CM

## 2018-04-05 DIAGNOSIS — Z30.09 ENCOUNTER FOR OTHER GENERAL COUNSELING OR ADVICE ON CONTRACEPTION: ICD-10-CM

## 2018-04-05 DIAGNOSIS — F32.A DEPRESSION, UNSPECIFIED DEPRESSION TYPE: ICD-10-CM

## 2018-04-05 DIAGNOSIS — Z00.00 WELL ADULT EXAM: Primary | ICD-10-CM

## 2018-04-05 DIAGNOSIS — F41.9 ANXIETY: ICD-10-CM

## 2018-04-05 DIAGNOSIS — F98.8 ATTENTION DEFICIT DISORDER (ADD) IN ADULT: ICD-10-CM

## 2018-04-05 PROCEDURE — 99395 PREV VISIT EST AGE 18-39: CPT | Performed by: NURSE PRACTITIONER

## 2018-04-05 RX ORDER — BUSPIRONE HYDROCHLORIDE 10 MG/1
TABLET ORAL
Qty: 60 TABLET | Refills: 1 | Status: SHIPPED | OUTPATIENT
Start: 2018-04-05 | End: 2018-05-16

## 2018-04-05 RX ORDER — SERTRALINE HYDROCHLORIDE 100 MG/1
100 TABLET, FILM COATED ORAL EVERY MORNING
Qty: 30 TABLET | Refills: 0 | Status: SHIPPED | OUTPATIENT
Start: 2018-04-05 | End: 2018-05-16

## 2018-04-05 NOTE — PROGRESS NOTES
HPI    Pt here to establish care-was seeing [employer]. Son is 11/ 1 1/2 months old. Pt states period is late but took pregnancy test yest and it was negative. Would like to discuss birth control options.    Pap- 8/2016 wnl    fmh-htn, thyroid-mater Diagnosis Date   • Amenorrhea     Hx of Irregular menses due to PCOS   • Anemia, postpartum 8/23/2017   • Anxiety age 25    Counseling. Medication started in 9/2016   • Decorative tattoo    • Depression age 25    Counseling.   Medication started in 9/2016 Constitutional: She is oriented to person, place, and time. She appears well-developed and well-nourished. No distress. HENT:   Head: Normocephalic and atraumatic.    Right Ear: Hearing, tympanic membrane, external ear and ear canal normal.   Left Ear: Ex TSH W Reflex To Free T4 [E]      Vitamin B12 [E]      Vitamin D, 25-Hydroxy [E]      Urinalysis, Routine [E]      Lisdexamfetamine Dimesylate (VYVANSE) 70 MG Oral Cap          Sig: Take 1 capsule (70 mg total) by mouth every morning.  AT [facility]

## 2018-04-05 NOTE — TELEPHONE ENCOUNTER
Pt was advised she is due for her annual exam.  Pt would like to also have an IUD. Pt was advised she can discuss IUD at time of appt with MBW. Pt agrees with plan. Appt scheduled.

## 2018-04-10 ENCOUNTER — TELEPHONE (OUTPATIENT)
Dept: OBGYN CLINIC | Facility: CLINIC | Age: 28
End: 2018-04-10

## 2018-04-11 NOTE — TELEPHONE ENCOUNTER
Unable to lm.  Rings then goes to busy signal. Letter sent via [employer] mail & [employer]

## 2018-04-17 ENCOUNTER — OFFICE VISIT (OUTPATIENT)
Dept: OTOLARYNGOLOGY | Facility: CLINIC | Age: 28
End: 2018-04-17

## 2018-04-17 ENCOUNTER — OFFICE VISIT (OUTPATIENT)
Dept: AUDIOLOGY | Facility: CLINIC | Age: 28
End: 2018-04-17

## 2018-04-17 VITALS
SYSTOLIC BLOOD PRESSURE: 118 MMHG | DIASTOLIC BLOOD PRESSURE: 70 MMHG | HEIGHT: 65 IN | TEMPERATURE: 98 F | BODY MASS INDEX: 21.66 KG/M2 | WEIGHT: 130 LBS

## 2018-04-17 DIAGNOSIS — R42 DIZZINESS: Primary | ICD-10-CM

## 2018-04-17 PROCEDURE — 99243 OFF/OP CNSLTJ NEW/EST LOW 30: CPT | Performed by: OTOLARYNGOLOGY

## 2018-04-17 PROCEDURE — 92557 COMPREHENSIVE HEARING TEST: CPT | Performed by: AUDIOLOGIST

## 2018-04-17 PROCEDURE — 99212 OFFICE O/P EST SF 10 MIN: CPT | Performed by: OTOLARYNGOLOGY

## 2018-04-17 PROCEDURE — 92567 TYMPANOMETRY: CPT | Performed by: AUDIOLOGIST

## 2018-04-17 RX ORDER — FLUTICASONE PROPIONATE 50 MCG
1 SPRAY, SUSPENSION (ML) NASAL 2 TIMES DAILY
Qty: 1 BOTTLE | Refills: 3 | Status: SHIPPED | OUTPATIENT
Start: 2018-04-17 | End: 2018-05-16

## 2018-04-17 RX ORDER — MONTELUKAST SODIUM 10 MG/1
10 TABLET ORAL NIGHTLY
Qty: 30 TABLET | Refills: 3 | Status: SHIPPED | OUTPATIENT
Start: 2018-04-17 | End: 2018-05-16

## 2018-04-17 NOTE — PROGRESS NOTES
Damaso Jenkins is a 34year old female.   Patient presents with:  Dizziness: pt reports dizziness all the time, when singing feels a vibration in both ears, has gotten worse in 3 yrs, had tubes in both ears as a child, slight pain both ears  Nose Problem: ru hysterectomy    • Hypertension Maternal Grandfather    • Diabetes Maternal Grandfather      Type II       Past Medical History:   Diagnosis Date   • Amenorrhea     Hx of Irregular menses due to PCOS   • Anemia, postpartum 8/23/2017   • Anxiety age 25    Co nose - Normal. Lips/teeth/gums - Normal. Tonsils - Normal. Oropharynx - Normal.   Ears Normal Inspection - Right: Normal, Left: Normal. Canal - Right: Normal, Left: Normal. TM - Right: Normal, Left: Normal.   Skin Normal Inspection - Normal.        Lymph D and to return to clinic in 1 month. If no better I strongly recommend that she undergo a balance study. Audiogram performed in the office today was completely normal.  - OP REFERRAL TO AUDIOLOGY            Godwin Santos.  Lyndsey Xie MD    4/17/2018    9:19 AM

## 2018-04-17 NOTE — PROGRESS NOTES
AUDIOGRAM     Lorena Whyte was referred for testing by Yumi Oh due to dizziness. 2/29/1990  [id number]    Otoscopic Inspection:  Right ear:  No cerumen  Left ear:  No cerumen    Audiometric Test Results:   Audiometric thresholds indicated normal

## 2018-04-18 ENCOUNTER — OFFICE VISIT (OUTPATIENT)
Dept: OBGYN CLINIC | Facility: CLINIC | Age: 28
End: 2018-04-18

## 2018-04-18 VITALS
DIASTOLIC BLOOD PRESSURE: 84 MMHG | WEIGHT: 120.25 LBS | SYSTOLIC BLOOD PRESSURE: 126 MMHG | BODY MASS INDEX: 19.32 KG/M2 | HEART RATE: 93 BPM | HEIGHT: 66 IN

## 2018-04-18 DIAGNOSIS — Z30.430 ENCOUNTER FOR INSERTION OF INTRAUTERINE CONTRACEPTIVE DEVICE: Primary | ICD-10-CM

## 2018-04-18 DIAGNOSIS — Z30.430 ENCOUNTER FOR IUD INSERTION: ICD-10-CM

## 2018-04-18 DIAGNOSIS — Z11.3 SCREEN FOR STD (SEXUALLY TRANSMITTED DISEASE): ICD-10-CM

## 2018-04-18 PROCEDURE — 58300 INSERT INTRAUTERINE DEVICE: CPT | Performed by: ADVANCED PRACTICE MIDWIFE

## 2018-04-18 PROCEDURE — 81025 URINE PREGNANCY TEST: CPT | Performed by: ADVANCED PRACTICE MIDWIFE

## 2018-04-18 NOTE — PROCEDURES
IUD Insertion     Pregnancy Results: negative from urine test   GC/CHL screen today  Birth control method(s) used:  condoms / withdrawal; date last used:      Consent signed.   Procedure discussed with the patient in detail including indication, risks, bene

## 2018-05-16 PROBLEM — N82.3 RECTOVAGINAL FISTULA: Status: RESOLVED | Noted: 2017-11-10 | Resolved: 2018-05-16

## 2018-05-16 PROBLEM — R42 DIZZY: Status: ACTIVE | Noted: 2018-05-16

## 2018-05-16 PROBLEM — O43.123 VELAMENTOUS INSERTION OF UMBILICAL CORD IN THIRD TRIMESTER: Status: RESOLVED | Noted: 2017-08-22 | Resolved: 2018-05-16

## 2018-05-16 PROBLEM — R51.9 CHRONIC NONINTRACTABLE HEADACHE: Status: ACTIVE | Noted: 2017-08-03

## 2018-05-16 PROBLEM — F41.8 ANXIETY WITH DEPRESSION: Status: ACTIVE | Noted: 2017-02-11

## 2018-05-16 PROBLEM — G89.29 CHRONIC NONINTRACTABLE HEADACHE: Status: ACTIVE | Noted: 2017-08-03

## 2018-05-16 PROBLEM — F32.A DEPRESSION: Status: ACTIVE | Noted: 2018-05-16

## 2018-05-16 PROBLEM — O43.123 VELAMENTOUS INSERTION OF UMBILICAL CORD IN THIRD TRIMESTER (HCC): Status: RESOLVED | Noted: 2017-08-22 | Resolved: 2018-05-16

## 2018-05-16 NOTE — PROGRESS NOTES
HPI  Pt here for f/u for anxiety-feeling well on buspar. Needs refills on ADHD meds-will be starting new job with new insurance so is waiting for that to happen before establishing care with psych.   Had IUD placed last week-has a little spotting-has f/u w • PCOS (polycystic ovarian syndrome)        .   Past Surgical History:   Procedure Laterality Date   • Other surgical history  age [age]    Tubes in the ears       Family History   Problem Relation Age of Onset   • Other [OTHER] Mother 39     hysterectomy Constitutional: She is oriented to person, place, and time. She appears well-developed and well-nourished. No distress. HENT:   Head: Normocephalic and atraumatic.        Right Ear: Tympanic membrane and ear canal normal. No cerumen present  Left Ear: Tym Anxiety with depression     con't buspar and sertraline  Will f/u psych         Relevant Medications    BusPIRone HCl 10 MG Oral Tab    Chronic nonintractable headache     Probable tension headache  Discussed ice to back of neck, relaxation exercises  Ref

## 2018-05-21 ENCOUNTER — TELEPHONE (OUTPATIENT)
Dept: OBGYN CLINIC | Facility: CLINIC | Age: 28
End: 2018-05-21

## 2018-10-08 ENCOUNTER — APPOINTMENT (OUTPATIENT)
Dept: LAB | Facility: HOSPITAL | Age: 28
End: 2018-10-08
Attending: NURSE PRACTITIONER
Payer: COMMERCIAL

## 2018-10-08 DIAGNOSIS — Z00.00 WELL ADULT EXAM: ICD-10-CM

## 2018-10-08 PROCEDURE — 36415 COLL VENOUS BLD VENIPUNCTURE: CPT

## 2018-10-08 PROCEDURE — 82306 VITAMIN D 25 HYDROXY: CPT

## 2018-10-08 PROCEDURE — 82607 VITAMIN B-12: CPT

## 2018-10-08 PROCEDURE — 85027 COMPLETE CBC AUTOMATED: CPT

## 2018-10-08 PROCEDURE — 84443 ASSAY THYROID STIM HORMONE: CPT

## 2018-10-08 PROCEDURE — 80053 COMPREHEN METABOLIC PANEL: CPT

## 2018-10-08 PROCEDURE — 81003 URINALYSIS AUTO W/O SCOPE: CPT

## 2018-10-08 PROCEDURE — 80061 LIPID PANEL: CPT

## 2018-10-09 ENCOUNTER — TELEPHONE (OUTPATIENT)
Dept: OBGYN CLINIC | Facility: CLINIC | Age: 28
End: 2018-10-09

## 2019-05-08 ENCOUNTER — OFFICE VISIT (OUTPATIENT)
Dept: OBGYN CLINIC | Facility: CLINIC | Age: 29
End: 2019-05-08
Payer: COMMERCIAL

## 2019-05-08 VITALS
HEART RATE: 87 BPM | WEIGHT: 131 LBS | BODY MASS INDEX: 21.05 KG/M2 | HEIGHT: 66 IN | DIASTOLIC BLOOD PRESSURE: 80 MMHG | SYSTOLIC BLOOD PRESSURE: 118 MMHG

## 2019-05-08 DIAGNOSIS — Z01.419 ENCOUNTER FOR GYNECOLOGICAL EXAMINATION WITHOUT ABNORMAL FINDING: Primary | ICD-10-CM

## 2019-05-08 DIAGNOSIS — Z30.431 IUD CHECK UP: ICD-10-CM

## 2019-05-08 DIAGNOSIS — N82.3 RECTAL VAGINAL FISTULA: ICD-10-CM

## 2019-05-08 PROCEDURE — 99395 PREV VISIT EST AGE 18-39: CPT | Performed by: ADVANCED PRACTICE MIDWIFE

## 2019-05-08 RX ORDER — ARIPIPRAZOLE 30 MG/1
TABLET ORAL
Refills: 0 | COMMUNITY
Start: 2019-05-03 | End: 2020-04-21

## 2019-05-08 RX ORDER — LAMOTRIGINE 25 MG/1
TABLET ORAL
Refills: 0 | COMMUNITY
Start: 2019-05-02 | End: 2019-09-17

## 2019-05-08 RX ORDER — ARIPIPRAZOLE 10 MG/1
TABLET ORAL
Refills: 0 | COMMUNITY
Start: 2019-05-06 | End: 2019-09-17

## 2019-05-08 RX ORDER — BUSPIRONE HYDROCHLORIDE 15 MG/1
TABLET ORAL
Refills: 0 | COMMUNITY
Start: 2019-05-02 | End: 2020-04-17

## 2019-05-08 RX ORDER — LISDEXAMFETAMINE DIMESYLATE 70 MG/1
CAPSULE ORAL
Refills: 0 | COMMUNITY
Start: 2019-05-02 | End: 2020-04-21

## 2019-05-13 NOTE — PROGRESS NOTES
HPI:   Julia Becerra is a 34year old female who presents for a gyne annual physical exam. Worried about IUD and some discomfort with certain movemements. Exercise: yes.      Wt Readings from Last 3 Encounters:  05/08/19 : 131 lb (59.4 kg)  04/18/18 : 1 vaginal discharge or itching, dyspareunia, reports periods absent  MUSCULOSKELETAL: denies back pain  PSYCHE: denies untreated depression or anxiety, denies exposure to violence or abuse.   ENDOCRINE: denies cold intolerance, weight gain, hair loss, palpita

## 2019-09-17 ENCOUNTER — OFFICE VISIT (OUTPATIENT)
Dept: FAMILY MEDICINE CLINIC | Facility: CLINIC | Age: 29
End: 2019-09-17

## 2019-09-17 VITALS
DIASTOLIC BLOOD PRESSURE: 78 MMHG | HEART RATE: 116 BPM | HEIGHT: 66 IN | SYSTOLIC BLOOD PRESSURE: 114 MMHG | BODY MASS INDEX: 22.5 KG/M2 | WEIGHT: 140 LBS | TEMPERATURE: 98 F

## 2019-09-17 DIAGNOSIS — Z30.431 IUD CHECK UP: Primary | ICD-10-CM

## 2019-09-17 PROCEDURE — 99385 PREV VISIT NEW AGE 18-39: CPT | Performed by: FAMILY MEDICINE

## 2019-09-17 RX ORDER — LAMOTRIGINE 200 MG/1
TABLET ORAL
Refills: 1 | COMMUNITY
Start: 2019-09-09 | End: 2020-04-21

## 2019-09-17 NOTE — PROGRESS NOTES
HPI:    Patient ID: Damaso Jenkins is a 34year old female. Patient here for first visit. Needs referral to have IUD removed . Feels that it is causing some back pain. And some cramps.    Otherwise well      Review of Systems   Constitutional: Negative

## 2019-10-14 ENCOUNTER — OFFICE VISIT (OUTPATIENT)
Dept: OBGYN CLINIC | Facility: CLINIC | Age: 29
End: 2019-10-14

## 2019-10-14 VITALS
HEIGHT: 66 IN | WEIGHT: 142 LBS | HEART RATE: 80 BPM | BODY MASS INDEX: 22.82 KG/M2 | DIASTOLIC BLOOD PRESSURE: 84 MMHG | SYSTOLIC BLOOD PRESSURE: 130 MMHG

## 2019-10-14 DIAGNOSIS — Z23 NEEDS FLU SHOT: ICD-10-CM

## 2019-10-14 DIAGNOSIS — Z30.432 ENCOUNTER FOR REMOVAL OF INTRAUTERINE CONTRACEPTIVE DEVICE: Primary | ICD-10-CM

## 2019-10-14 PROCEDURE — 90471 IMMUNIZATION ADMIN: CPT | Performed by: ADVANCED PRACTICE MIDWIFE

## 2019-10-14 PROCEDURE — 90686 IIV4 VACC NO PRSV 0.5 ML IM: CPT | Performed by: ADVANCED PRACTICE MIDWIFE

## 2019-10-14 PROCEDURE — 58301 REMOVE INTRAUTERINE DEVICE: CPT | Performed by: ADVANCED PRACTICE MIDWIFE

## 2019-10-14 RX ORDER — NORETHINDRONE ACETATE AND ETHINYL ESTRADIOL 1; .02 MG/1; MG/1
1 TABLET ORAL DAILY
Qty: 1 PACKAGE | Refills: 11 | Status: SHIPPED | OUTPATIENT
Start: 2019-10-14 | End: 2020-03-12 | Stop reason: ALTCHOICE

## 2019-10-14 NOTE — PROCEDURES
IUD Removal     Pregnancy Results: negative from urine test   Desires IUD removal. Would like to start OCPs. Consent signed. Procedure discussed with the patient in detail including indication, risks, benefits, alternatives and complications.     Yovany

## 2020-01-04 NOTE — Clinical Note
Adderall and Vyvance not recommended during pregnancy Level 2 US at 20wks Early diabetes testing. Self

## 2020-03-10 ENCOUNTER — HOSPITAL ENCOUNTER (OUTPATIENT)
Age: 30
Discharge: HOME | End: 2020-03-10
Payer: COMMERCIAL

## 2020-03-10 VITALS
HEART RATE: 92 BPM | DIASTOLIC BLOOD PRESSURE: 67 MMHG | OXYGEN SATURATION: 100 % | BODY MASS INDEX: 23 KG/M2 | SYSTOLIC BLOOD PRESSURE: 117 MMHG | WEIGHT: 140 LBS | RESPIRATION RATE: 20 BRPM | TEMPERATURE: 98 F

## 2020-03-10 DIAGNOSIS — J02.0 STREPTOCOCCAL SORE THROAT: Primary | ICD-10-CM

## 2020-03-10 LAB
POCT INFLUENZA A: NEGATIVE
POCT INFLUENZA B: NEGATIVE
S PYO AG THROAT QL: POSITIVE

## 2020-03-10 PROCEDURE — 87430 STREP A AG IA: CPT

## 2020-03-10 PROCEDURE — 99214 OFFICE O/P EST MOD 30 MIN: CPT

## 2020-03-10 PROCEDURE — 87502 INFLUENZA DNA AMP PROBE: CPT | Performed by: NURSE PRACTITIONER

## 2020-03-10 PROCEDURE — 99213 OFFICE O/P EST LOW 20 MIN: CPT

## 2020-03-10 RX ORDER — AMOXICILLIN 875 MG/1
875 TABLET, COATED ORAL 2 TIMES DAILY
Qty: 20 TABLET | Refills: 0 | Status: SHIPPED | OUTPATIENT
Start: 2020-03-10 | End: 2020-03-20

## 2020-03-10 NOTE — ED PROVIDER NOTES
Patient presents with:  Sore Throat      HPI:     Mark Irizarry is a 31year old female with a past history of bipolar disorder, PCOS presents with a chief complaint of voice hoarseness, sore throat, runny nose and chills over the course of the last 2 days amoxicillin twice daily x10 days. I have also discussed supportive care and close follow-up with her. I do not believe any further imaging or laboratory warranted at this time. Patient verbalized plan of care and states understanding.      Orders Placed

## 2020-03-10 NOTE — ED INITIAL ASSESSMENT (HPI)
Reports voice hoarseness, sore throat nasal congestion for the last 2 days. Reports recent exposure to flu and and strep. + chills and body aches.

## 2020-03-12 NOTE — PROGRESS NOTES
HPI:    Patient ID: Stef Patel is a 31year old female. Patient is here for clearance for IOP ( intenstive outpatient program ) for bipolar disorder. Feels tired no other physical symptoms. Review of Systems   Constitutional: Negative.   Neg breath sounds normal. No respiratory distress. She has no wheezes. She has no rales. Abdominal: Soft. Bowel sounds are normal. She exhibits no distension. There is no tenderness.    Musculoskeletal:      Comments: Mild back tenderness              ASSESSM

## 2020-03-23 ENCOUNTER — TELEPHONE (OUTPATIENT)
Dept: OBGYN CLINIC | Facility: CLINIC | Age: 30
End: 2020-03-23

## 2020-03-23 ENCOUNTER — APPOINTMENT (OUTPATIENT)
Dept: LAB | Facility: HOSPITAL | Age: 30
End: 2020-03-23
Attending: ADVANCED PRACTICE MIDWIFE
Payer: COMMERCIAL

## 2020-03-23 DIAGNOSIS — N92.6 MISSED MENSES: ICD-10-CM

## 2020-03-23 DIAGNOSIS — N92.6 MISSED MENSES: Primary | ICD-10-CM

## 2020-03-23 LAB
B-HCG SERPL-ACNC: 1 MIU/ML
PROGEST SERPL-MCNC: 1.37 NG/ML

## 2020-03-23 PROCEDURE — 84144 ASSAY OF PROGESTERONE: CPT

## 2020-03-23 PROCEDURE — 84702 CHORIONIC GONADOTROPIN TEST: CPT

## 2020-03-23 PROCEDURE — 99442 PHONE E/M BY PHYS 11-20 MIN: CPT | Performed by: ADVANCED PRACTICE MIDWIFE

## 2020-03-23 PROCEDURE — 36415 COLL VENOUS BLD VENIPUNCTURE: CPT

## 2020-03-23 NOTE — TELEPHONE ENCOUNTER
Virtual/Telephone Check-In    Harikipteodoro Lauren verbally consents to a Virtual/Telephone Check-In service on 03/23/20. Patient understands and accepts financial responsibility for any deductible, co-insurance and/or co-pays associated with this service.     Du Family History   Problem Relation Age of Onset   • Other (Other) Mother 39        hysterectomy    • Hypertension Maternal Grandfather    • Diabetes Maternal Grandfather         Type II      Social History:   Social History    Tobacco Use      Smoking sta

## 2020-05-14 ENCOUNTER — TELEPHONE (OUTPATIENT)
Dept: OBGYN CLINIC | Facility: CLINIC | Age: 30
End: 2020-05-14

## 2020-05-14 NOTE — TELEPHONE ENCOUNTER
Pt desires ocp consultation. Offered phone visit & pt accepted & scheduled.  Pt verbalized an understanding & agrees w/ plan

## 2020-05-20 ENCOUNTER — VIRTUAL PHONE E/M (OUTPATIENT)
Dept: OBGYN CLINIC | Facility: CLINIC | Age: 30
End: 2020-05-20
Payer: COMMERCIAL

## 2020-05-20 DIAGNOSIS — Z30.011 INITIATION OF ORAL CONTRACEPTION: Primary | ICD-10-CM

## 2020-05-20 PROCEDURE — 99213 OFFICE O/P EST LOW 20 MIN: CPT | Performed by: ADVANCED PRACTICE MIDWIFE

## 2020-05-20 RX ORDER — NORETHINDRONE ACETATE AND ETHINYL ESTRADIOL 1; .02 MG/1; MG/1
1 TABLET ORAL DAILY
Qty: 1 PACKAGE | Refills: 11 | Status: SHIPPED | OUTPATIENT
Start: 2020-05-20 | End: 2021-03-16

## 2020-05-20 NOTE — PROGRESS NOTES
Virtual Telephone Check-In    Brendareshma Guajardo verbally consents to a Virtual/Telephone Check-In visit on 05/20/20. Patient has been referred to the [facility] website at [email or web address] to review the yearly Consent to Treat document.     Patient Candi Cruz Anemia, postpartum 8/23/2017   • Anxiety age 25    Counseling. Medication started in 9/2016   • Bipolar 1 disorder [facility])    • Decorative tattoo    • Depression age 25    Counseling.   Medication started in 9/2016   • History of chicken pox age  12   • PCOS

## 2020-06-03 ENCOUNTER — HOSPITAL ENCOUNTER (OUTPATIENT)
Age: 30
Discharge: HOME | End: 2020-06-03
Payer: COMMERCIAL

## 2020-06-03 VITALS
HEART RATE: 80 BPM | SYSTOLIC BLOOD PRESSURE: 114 MMHG | TEMPERATURE: 98 F | OXYGEN SATURATION: 99 % | RESPIRATION RATE: 18 BRPM | DIASTOLIC BLOOD PRESSURE: 54 MMHG

## 2020-06-03 DIAGNOSIS — L50.9 URTICARIA: Primary | ICD-10-CM

## 2020-06-03 PROCEDURE — 99213 OFFICE O/P EST LOW 20 MIN: CPT

## 2020-06-03 PROCEDURE — 99214 OFFICE O/P EST MOD 30 MIN: CPT

## 2020-06-03 RX ORDER — PREDNISONE 20 MG/1
40 TABLET ORAL DAILY
Qty: 8 TABLET | Refills: 0 | Status: SHIPPED | OUTPATIENT
Start: 2020-06-03 | End: 2020-06-08

## 2020-06-03 NOTE — ED PROVIDER NOTES
Patient Seen in: [address] [place] [address]      History   Patient presents with:  Hives    Stated Complaint: rash    HPI    32 yo female with pmh of bipolar d/o, adhd, anxiety and  Depression presents to ED for evaluation of rash.   Pt s General: She is not in acute distress. HENT:      Head: Normocephalic and atraumatic.       Right Ear: External ear normal.      Left Ear: External ear normal.      Nose: Nose normal.      Mouth/Throat:      Mouth: Mucous membranes are moist.   Eyes:

## 2020-06-09 ENCOUNTER — TELEPHONE (OUTPATIENT)
Dept: FAMILY MEDICINE CLINIC | Facility: CLINIC | Age: 30
End: 2020-06-09

## 2020-06-09 ENCOUNTER — TELEMEDICINE (OUTPATIENT)
Dept: FAMILY MEDICINE CLINIC | Facility: CLINIC | Age: 30
End: 2020-06-09

## 2020-06-09 VITALS — TEMPERATURE: 94 F

## 2020-06-09 DIAGNOSIS — L50.9 HIVES: Primary | ICD-10-CM

## 2020-06-09 PROCEDURE — 99213 OFFICE O/P EST LOW 20 MIN: CPT | Performed by: PHYSICIAN ASSISTANT

## 2020-06-09 NOTE — TELEPHONE ENCOUNTER
Pt s/sx started this morning. But she has been having them for a couple weeks. Pt stated that the first time she went to I/C and she was informed that it was just hives. Pt was given steroids and everything disappeared in about 3 hours.   Pt s/sx are  throwi

## 2020-06-12 ENCOUNTER — HOSPITAL ENCOUNTER (OUTPATIENT)
Age: 30
Discharge: HOME | End: 2020-06-12
Attending: EMERGENCY MEDICINE
Payer: COMMERCIAL

## 2020-06-12 VITALS
DIASTOLIC BLOOD PRESSURE: 71 MMHG | OXYGEN SATURATION: 98 % | SYSTOLIC BLOOD PRESSURE: 127 MMHG | RESPIRATION RATE: 18 BRPM | TEMPERATURE: 99 F | HEART RATE: 105 BPM

## 2020-06-12 DIAGNOSIS — S05.8X1A EYE TRAUMA, SUPERFICIAL, RIGHT, INITIAL ENCOUNTER: Primary | ICD-10-CM

## 2020-06-12 PROCEDURE — 99213 OFFICE O/P EST LOW 20 MIN: CPT

## 2020-06-12 PROCEDURE — 99212 OFFICE O/P EST SF 10 MIN: CPT
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
## 2021-01-12 ENCOUNTER — TELEMEDICINE (OUTPATIENT)
Dept: FAMILY MEDICINE CLINIC | Facility: CLINIC | Age: 31
End: 2021-01-12

## 2021-01-12 DIAGNOSIS — R53.83 FATIGUE, UNSPECIFIED TYPE: Primary | ICD-10-CM

## 2021-01-12 DIAGNOSIS — R11.2 NAUSEA AND VOMITING, INTRACTABILITY OF VOMITING NOT SPECIFIED, UNSPECIFIED VOMITING TYPE: ICD-10-CM

## 2021-01-12 PROCEDURE — 99213 OFFICE O/P EST LOW 20 MIN: CPT | Performed by: FAMILY MEDICINE

## 2021-01-12 RX ORDER — ONDANSETRON 4 MG/1
4 TABLET, FILM COATED ORAL EVERY 8 HOURS PRN
Qty: 20 TABLET | Refills: 0 | Status: SHIPPED | OUTPATIENT
Start: 2021-01-12 | End: 2021-03-16

## 2021-01-12 NOTE — PROGRESS NOTES
HPI:    Patient ID: Suresh Mullen is a 31year old female.     Telehealth outside of [facility] [address] Verbal Consent   I conducted a telehealth visit with Suresh Mullen today, 01/15/21, which was completed using two-way, real-time interactive audio and v pain.   No headache. Feels tired. Yesterday found that son was exposed to covid. Works from home. Review of Systems    Constitutional: feels tired . No fever     Respiratory: Negative.   Negative for apnea, cough, chest tightness and shortness of br

## 2021-03-16 ENCOUNTER — LAB ENCOUNTER (OUTPATIENT)
Dept: LAB | Facility: HOSPITAL | Age: 31
End: 2021-03-16
Attending: FAMILY MEDICINE
Payer: COMMERCIAL

## 2021-03-16 ENCOUNTER — OFFICE VISIT (OUTPATIENT)
Dept: FAMILY MEDICINE CLINIC | Facility: CLINIC | Age: 31
End: 2021-03-16
Payer: COMMERCIAL

## 2021-03-16 VITALS
HEART RATE: 85 BPM | HEIGHT: 66 IN | DIASTOLIC BLOOD PRESSURE: 80 MMHG | BODY MASS INDEX: 26.84 KG/M2 | WEIGHT: 167 LBS | SYSTOLIC BLOOD PRESSURE: 125 MMHG

## 2021-03-16 DIAGNOSIS — R11.0 NAUSEA: Primary | ICD-10-CM

## 2021-03-16 DIAGNOSIS — R53.83 FATIGUE, UNSPECIFIED TYPE: ICD-10-CM

## 2021-03-16 LAB
ALBUMIN SERPL-MCNC: 4 G/DL (ref 3.4–5)
ALBUMIN/GLOB SERPL: 1.2 {RATIO} (ref 1–2)
ALP LIVER SERPL-CCNC: 87 U/L
ALT SERPL-CCNC: 21 U/L
ANION GAP SERPL CALC-SCNC: 4 MMOL/L (ref 0–18)
AST SERPL-CCNC: 11 U/L (ref 15–37)
BASOPHILS # BLD AUTO: 0.09 X10(3) UL (ref 0–0.2)
BASOPHILS NFR BLD AUTO: 1 %
BILIRUB SERPL-MCNC: 0.4 MG/DL (ref 0.1–2)
BUN BLD-MCNC: 7 MG/DL (ref 7–18)
BUN/CREAT SERPL: 7.1 (ref 10–20)
CALCIUM BLD-MCNC: 9 MG/DL (ref 8.5–10.1)
CHLORIDE SERPL-SCNC: 104 MMOL/L (ref 98–112)
CO2 SERPL-SCNC: 30 MMOL/L (ref 21–32)
CREAT BLD-MCNC: 0.98 MG/DL
DEPRECATED RDW RBC AUTO: 39.4 FL (ref 35.1–46.3)
EOSINOPHIL # BLD AUTO: 0.26 X10(3) UL (ref 0–0.7)
EOSINOPHIL NFR BLD AUTO: 3 %
ERYTHROCYTE [DISTWIDTH] IN BLOOD BY AUTOMATED COUNT: 11.9 % (ref 11–15)
GLOBULIN PLAS-MCNC: 3.4 G/DL (ref 2.8–4.4)
GLUCOSE BLD-MCNC: 79 MG/DL (ref 70–99)
HCG SERPL QL: NEGATIVE
HCT VFR BLD AUTO: 42 %
HGB BLD-MCNC: 13.7 G/DL
IMM GRANULOCYTES # BLD AUTO: 0.02 X10(3) UL (ref 0–1)
IMM GRANULOCYTES NFR BLD: 0.2 %
LYMPHOCYTES # BLD AUTO: 2.83 X10(3) UL (ref 1–4)
LYMPHOCYTES NFR BLD AUTO: 32.5 %
M PROTEIN MFR SERPL ELPH: 7.4 G/DL (ref 6.4–8.2)
MCH RBC QN AUTO: 29.5 PG (ref 26–34)
MCHC RBC AUTO-ENTMCNC: 32.6 G/DL (ref 31–37)
MCV RBC AUTO: 90.3 FL
MONOCYTES # BLD AUTO: 0.72 X10(3) UL (ref 0.1–1)
MONOCYTES NFR BLD AUTO: 8.3 %
NEUTROPHILS # BLD AUTO: 4.8 X10 (3) UL (ref 1.5–7.7)
NEUTROPHILS # BLD AUTO: 4.8 X10(3) UL (ref 1.5–7.7)
NEUTROPHILS NFR BLD AUTO: 55 %
OSMOLALITY SERPL CALC.SUM OF ELEC: 283 MOSM/KG (ref 275–295)
PATIENT FASTING Y/N/NP: NO
PLATELET # BLD AUTO: 342 10(3)UL (ref 150–450)
POTASSIUM SERPL-SCNC: 4.1 MMOL/L (ref 3.5–5.1)
RBC # BLD AUTO: 4.65 X10(6)UL
SODIUM SERPL-SCNC: 138 MMOL/L (ref 136–145)
TSI SER-ACNC: 0.9 MIU/ML (ref 0.36–3.74)
WBC # BLD AUTO: 8.7 X10(3) UL (ref 4–11)

## 2021-03-16 PROCEDURE — 3008F BODY MASS INDEX DOCD: CPT | Performed by: FAMILY MEDICINE

## 2021-03-16 PROCEDURE — 3074F SYST BP LT 130 MM HG: CPT | Performed by: FAMILY MEDICINE

## 2021-03-16 PROCEDURE — 84703 CHORIONIC GONADOTROPIN ASSAY: CPT

## 2021-03-16 PROCEDURE — 36415 COLL VENOUS BLD VENIPUNCTURE: CPT

## 2021-03-16 PROCEDURE — 84443 ASSAY THYROID STIM HORMONE: CPT

## 2021-03-16 PROCEDURE — 80053 COMPREHEN METABOLIC PANEL: CPT

## 2021-03-16 PROCEDURE — 99214 OFFICE O/P EST MOD 30 MIN: CPT | Performed by: FAMILY MEDICINE

## 2021-03-16 PROCEDURE — 3079F DIAST BP 80-89 MM HG: CPT | Performed by: FAMILY MEDICINE

## 2021-03-16 PROCEDURE — 85025 COMPLETE CBC W/AUTO DIFF WBC: CPT

## 2021-03-16 RX ORDER — ARIPIPRAZOLE 30 MG/1
30 TABLET ORAL DAILY
COMMUNITY
Start: 2021-01-12

## 2021-03-16 RX ORDER — ARIPIPRAZOLE 10 MG/1
10 TABLET ORAL DAILY
COMMUNITY
Start: 2021-01-13

## 2021-03-16 RX ORDER — SERTRALINE HYDROCHLORIDE 100 MG/1
150 TABLET, FILM COATED ORAL DAILY
COMMUNITY
Start: 2021-03-08 | End: 2021-04-29

## 2021-03-16 RX ORDER — ATOMOXETINE 40 MG/1
CAPSULE ORAL
COMMUNITY
Start: 2020-12-21 | End: 2021-04-29

## 2021-03-16 RX ORDER — LAMOTRIGINE 200 MG/1
200 TABLET ORAL DAILY
COMMUNITY
Start: 2021-03-09

## 2021-03-16 RX ORDER — HYDROXYZINE PAMOATE 100 MG/1
CAPSULE ORAL
COMMUNITY
Start: 2020-09-30 | End: 2021-05-07

## 2021-03-16 RX ORDER — MULTIVITAMIN,THERAPEUTIC
1 TABLET ORAL DAILY
COMMUNITY
End: 2021-05-07

## 2021-03-16 RX ORDER — ONDANSETRON 4 MG/1
TABLET, FILM COATED ORAL
Qty: 40 TABLET | Refills: 0 | Status: SHIPPED | OUTPATIENT
Start: 2021-03-16 | End: 2021-04-29

## 2021-03-16 NOTE — PROGRESS NOTES
HPI/Subjective:   Patient ID: Nadya Barron is a 36year old female. Nausea since mid January. Every am and sometimes in the evening has really bad nausea . Has vomiting in ams. Also can not eat . Just eats a little bit . Feels tired.    Has 2 jobs heart sounds. No murmur. No gallop. Pulmonary:      Effort: Pulmonary effort is normal. No respiratory distress. Breath sounds: Normal breath sounds. No wheezing. Abdominal:      General: There is no distension. Tenderness:  There is no abdom

## 2021-04-09 ENCOUNTER — IMMUNIZATION (OUTPATIENT)
Dept: LAB | Age: 31
End: 2021-04-09
Attending: HOSPITALIST
Payer: COMMERCIAL

## 2021-04-09 DIAGNOSIS — Z23 NEED FOR VACCINATION: Primary | ICD-10-CM

## 2021-04-09 PROCEDURE — 0001A SARSCOV2 VAC 30MCG/0.3ML IM: CPT

## 2021-04-29 ENCOUNTER — TELEMEDICINE (OUTPATIENT)
Dept: FAMILY MEDICINE CLINIC | Facility: CLINIC | Age: 31
End: 2021-04-29

## 2021-04-29 DIAGNOSIS — R11.2 NAUSEA AND VOMITING, INTRACTABILITY OF VOMITING NOT SPECIFIED, UNSPECIFIED VOMITING TYPE: Primary | ICD-10-CM

## 2021-04-29 DIAGNOSIS — F41.9 ANXIETY: ICD-10-CM

## 2021-04-29 PROCEDURE — 99213 OFFICE O/P EST LOW 20 MIN: CPT | Performed by: FAMILY MEDICINE

## 2021-04-29 NOTE — PROGRESS NOTES
HPI/Subjective:   Patient ID: Odalys Saez is a 36year old female.     Telehealth outside of [facility] [address] Verbal Consent   I conducted a telehealth visit with Odalys Saez today, 04/29/21, which was completed using two-way, real-time i [employer]      History/Other:   Review of Systems   Constitutional: Negative. Negative for activity change, appetite change, diaphoresis and fatigue. Respiratory: Negative. Negative for apnea, cough, chest tightness and shortness of breath.     Cardio

## 2021-04-30 ENCOUNTER — IMMUNIZATION (OUTPATIENT)
Dept: LAB | Age: 31
End: 2021-04-30
Attending: HOSPITALIST
Payer: COMMERCIAL

## 2021-04-30 DIAGNOSIS — Z23 NEED FOR VACCINATION: Primary | ICD-10-CM

## 2021-04-30 PROCEDURE — 0002A SARSCOV2 VAC 30MCG/0.3ML IM: CPT

## 2021-05-02 ENCOUNTER — HOSPITAL ENCOUNTER (EMERGENCY)
Facility: HOSPITAL | Age: 31
Discharge: HOME | End: 2021-05-02
Attending: EMERGENCY MEDICINE
Payer: COMMERCIAL

## 2021-05-02 ENCOUNTER — TELEPHONE (OUTPATIENT)
Dept: FAMILY MEDICINE CLINIC | Facility: CLINIC | Age: 31
End: 2021-05-02

## 2021-05-02 VITALS
OXYGEN SATURATION: 98 % | BODY MASS INDEX: 25.71 KG/M2 | HEART RATE: 57 BPM | SYSTOLIC BLOOD PRESSURE: 110 MMHG | RESPIRATION RATE: 18 BRPM | HEIGHT: 66 IN | WEIGHT: 160 LBS | DIASTOLIC BLOOD PRESSURE: 74 MMHG | TEMPERATURE: 98 F

## 2021-05-02 DIAGNOSIS — R19.7 DIARRHEA, UNSPECIFIED TYPE: ICD-10-CM

## 2021-05-02 DIAGNOSIS — N30.90 CYSTITIS: ICD-10-CM

## 2021-05-02 DIAGNOSIS — K29.00 ACUTE GASTRITIS WITHOUT HEMORRHAGE, UNSPECIFIED GASTRITIS TYPE: Primary | ICD-10-CM

## 2021-05-02 PROCEDURE — 96375 TX/PRO/DX INJ NEW DRUG ADDON: CPT

## 2021-05-02 PROCEDURE — 87086 URINE CULTURE/COLONY COUNT: CPT | Performed by: EMERGENCY MEDICINE

## 2021-05-02 PROCEDURE — 87088 URINE BACTERIA CULTURE: CPT | Performed by: EMERGENCY MEDICINE

## 2021-05-02 PROCEDURE — 80048 BASIC METABOLIC PNL TOTAL CA: CPT | Performed by: EMERGENCY MEDICINE

## 2021-05-02 PROCEDURE — 80076 HEPATIC FUNCTION PANEL: CPT | Performed by: EMERGENCY MEDICINE

## 2021-05-02 PROCEDURE — 99284 EMERGENCY DEPT VISIT MOD MDM: CPT

## 2021-05-02 PROCEDURE — 96361 HYDRATE IV INFUSION ADD-ON: CPT

## 2021-05-02 PROCEDURE — 87186 SC STD MICRODIL/AGAR DIL: CPT | Performed by: EMERGENCY MEDICINE

## 2021-05-02 PROCEDURE — S0028 INJECTION, FAMOTIDINE, 20 MG: HCPCS | Performed by: EMERGENCY MEDICINE

## 2021-05-02 PROCEDURE — 96374 THER/PROPH/DIAG INJ IV PUSH: CPT

## 2021-05-02 PROCEDURE — 81001 URINALYSIS AUTO W/SCOPE: CPT | Performed by: EMERGENCY MEDICINE

## 2021-05-02 RX ORDER — METOCLOPRAMIDE 10 MG/1
5 TABLET ORAL 3 TIMES DAILY PRN
Qty: 20 TABLET | Refills: 0 | Status: SHIPPED | OUTPATIENT
Start: 2021-05-02 | End: 2021-06-01

## 2021-05-02 RX ORDER — ONDANSETRON 2 MG/ML
4 INJECTION INTRAMUSCULAR; INTRAVENOUS ONCE
Status: COMPLETED | OUTPATIENT
Start: 2021-05-02 | End: 2021-05-02

## 2021-05-02 RX ORDER — NITROFURANTOIN 25; 75 MG/1; MG/1
100 CAPSULE ORAL 2 TIMES DAILY
Qty: 10 CAPSULE | Refills: 0 | Status: SHIPPED | OUTPATIENT
Start: 2021-05-02 | End: 2021-05-07

## 2021-05-02 RX ORDER — FAMOTIDINE 10 MG/ML
20 INJECTION, SOLUTION INTRAVENOUS ONCE
Status: COMPLETED | OUTPATIENT
Start: 2021-05-02 | End: 2021-05-02

## 2021-05-02 RX ORDER — SACCHAROMYCES BOULARDII 250 MG
250 CAPSULE ORAL 2 TIMES DAILY
Qty: 28 CAPSULE | Refills: 0 | Status: SHIPPED | OUTPATIENT
Start: 2021-05-02 | End: 2021-05-16

## 2021-05-02 RX ORDER — FAMOTIDINE 20 MG/1
20 TABLET ORAL 2 TIMES DAILY PRN
Qty: 30 TABLET | Refills: 0 | Status: SHIPPED | OUTPATIENT
Start: 2021-05-02 | End: 2021-06-01

## 2021-05-02 NOTE — ED INITIAL ASSESSMENT (HPI)
Patient presents to ER with complaints of nausea, vomiting, diarrhea. Per patient she has been having issues with vomiting since January, as of last night has had very bad diarrhea. Patient admits to shakes and overall not feeling well.

## 2021-05-02 NOTE — TELEPHONE ENCOUNTER
On call:    Patient paged with complains of feeling malaise and no appetite for the last few weeks. She feels is going on for some time. She started having some vomiting few weeks ago as well.   Yesterday she started having diarrhea and she states she has

## 2021-05-02 NOTE — CM/SW NOTE
Spoke with patient regarding her need for a follow up appt with GI specialist.      Patient stated that she called [address] [place] [address] physicians and the next available is September, 2021.    EDCM stated that there is no appt scheduled for September, 2021 and michael

## 2021-05-03 NOTE — CM/SW NOTE
[facility] called Dr Mosqueda's office for a follow up appt. The soonest is June 8th at 9am with Jennifer Coley at [address] [place] [address] The pt will be placed on a wait list for a follow up appointment. The pt contacted and notified of the appointment.

## 2021-05-04 NOTE — ED PROVIDER NOTES
Patient Seen in: [facility] Emergency Department    History   Patient presents with:  Nausea/Vomiting/Diarrhea    Stated Complaint: n/v/d    HPI    Patient complains of n/v/d with mild gen abdominal pain that began a while ago but the diarrhea is Other (Other) Mother 39        hysterectomy    • Hypertension Maternal Grandfather    • Diabetes Maternal Grandfather         Type II       Social History    Tobacco Use      Smoking status: Never Smoker      Smokeless tobacco: Never Used    Vaping Use Gastritis vs. Enteritis vs. Biliary colic          ED Course     Labs Reviewed   BASIC METABOLIC PANEL (8) - Abnormal; Notable for the following components:       Result Value    BUN/CREA Ratio 8.1 (*)     All other components within normal limits   HEPATI (PEPCID) 20 MG Oral Tab  Take 1 tablet (20 mg total) by mouth 2 (two) times daily as needed for Heartburn., Normal, Disp-30 tablet, R-0    Nitrofurantoin Monohyd Macro 100 MG Oral Cap  Take 1 capsule (100 mg total) by mouth 2 (two) times daily for 5 days. ,

## 2021-05-07 ENCOUNTER — OFFICE VISIT (OUTPATIENT)
Dept: FAMILY MEDICINE CLINIC | Facility: CLINIC | Age: 31
End: 2021-05-07
Payer: COMMERCIAL

## 2021-05-07 VITALS
BODY MASS INDEX: 25.71 KG/M2 | WEIGHT: 160 LBS | DIASTOLIC BLOOD PRESSURE: 71 MMHG | HEIGHT: 66 IN | SYSTOLIC BLOOD PRESSURE: 105 MMHG | HEART RATE: 65 BPM

## 2021-05-07 DIAGNOSIS — N30.00 ACUTE CYSTITIS WITHOUT HEMATURIA: ICD-10-CM

## 2021-05-07 DIAGNOSIS — F32.89 OTHER DEPRESSION: ICD-10-CM

## 2021-05-07 DIAGNOSIS — K52.9 GASTROENTERITIS: Primary | ICD-10-CM

## 2021-05-07 PROCEDURE — 3078F DIAST BP <80 MM HG: CPT | Performed by: FAMILY MEDICINE

## 2021-05-07 PROCEDURE — 99214 OFFICE O/P EST MOD 30 MIN: CPT | Performed by: FAMILY MEDICINE

## 2021-05-07 PROCEDURE — 3074F SYST BP LT 130 MM HG: CPT | Performed by: FAMILY MEDICINE

## 2021-05-07 PROCEDURE — 3008F BODY MASS INDEX DOCD: CPT | Performed by: FAMILY MEDICINE

## 2021-05-07 RX ORDER — ESKETAMINE HYDROCHLORIDE 28 MG/.2ML
SOLUTION NASAL
COMMUNITY

## 2021-05-07 RX ORDER — HYDROXYZINE PAMOATE 50 MG/1
50 CAPSULE ORAL
COMMUNITY
Start: 2021-03-30

## 2021-05-07 RX ORDER — CIPROFLOXACIN 250 MG/1
250 TABLET, FILM COATED ORAL 2 TIMES DAILY
Qty: 10 TABLET | Refills: 0 | Status: SHIPPED | OUTPATIENT
Start: 2021-05-07 | End: 2021-07-29

## 2021-05-07 NOTE — PROGRESS NOTES
HPI/Subjective:   Patient ID: Flory Schumacher is a 36year old female. Patient for f/u er. Has uti, had gastritis. Doing better   No more diarrhea.  Vomiting still 2 times a week ( used to do that prior to gastroeneteritis daily)   [address] Allergies    Objective:   Physical Exam  Constitutional:       Appearance: She is well-developed. Cardiovascular:      Rate and Rhythm: Normal rate and regular rhythm. Heart sounds: Normal heart sounds.    Pulmonary:      Effort: Pulmonary effort is

## 2021-05-18 NOTE — H&P
[address] [facility] - Gastroenterology                                                                                                               Reason for consult: er f/u    Requesting physician or provider: Balbir Bello MD    [facility] Hx of Irregular menses due to PCOS   • Anemia, postpartum 8/23/2017   • Anxiety age 25    Counseling. Medication started in 9/2016   • Bipolar 1 disorder [facility])    • Decorative tattoo    • Depression age 25    Counseling.   Medication started in 9/2016   • mg total) by mouth 3 (three) times daily as needed. (Patient not taking: Reported on 5/25/2021 ) 20 tablet 0   • famoTIDine (PEPCID) 20 MG Oral Tab Take 1 tablet (20 mg total) by mouth 2 (two) times daily as needed for Heartburn.  (Patient not taking: Repor 8760 hour(s))   CBC W/ DIFFERENTIAL    Collection Time: 03/16/21  9:39 AM   Result Value Ref Range    WBC 8.7 4.0 - 11.0 x10(3) uL    RBC 4.65 3.80 - 5.30 x10(6)uL    HGB 13.7 12.0 - 16.0 g/dL    HCT 42.0 35.0 - 48.0 %    MCV 90.3 80.0 - 100.0 fL    MCH 29 use w/o change in symptoms. Seen in ed with unremarkable labs and found to have uti s/p treatment w/o resolution of complaints. She denies dysphagia, melena. Weight was down, but up today from comparison. She denies a fhx gi malignancy, celiac.   Paternal

## 2021-05-25 ENCOUNTER — OFFICE VISIT (OUTPATIENT)
Dept: GASTROENTEROLOGY | Facility: CLINIC | Age: 31
End: 2021-05-25
Payer: COMMERCIAL

## 2021-05-25 ENCOUNTER — TELEPHONE (OUTPATIENT)
Dept: GASTROENTEROLOGY | Facility: CLINIC | Age: 31
End: 2021-05-25

## 2021-05-25 VITALS
SYSTOLIC BLOOD PRESSURE: 106 MMHG | BODY MASS INDEX: 26.84 KG/M2 | HEART RATE: 80 BPM | DIASTOLIC BLOOD PRESSURE: 71 MMHG | HEIGHT: 66 IN | WEIGHT: 167 LBS

## 2021-05-25 DIAGNOSIS — R14.0 BLOATING: ICD-10-CM

## 2021-05-25 DIAGNOSIS — R11.2 NAUSEA AND VOMITING, INTRACTABILITY OF VOMITING NOT SPECIFIED, UNSPECIFIED VOMITING TYPE: Primary | ICD-10-CM

## 2021-05-25 DIAGNOSIS — R14.2 BELCHING: ICD-10-CM

## 2021-05-25 PROCEDURE — 3074F SYST BP LT 130 MM HG: CPT | Performed by: NURSE PRACTITIONER

## 2021-05-25 PROCEDURE — 3008F BODY MASS INDEX DOCD: CPT | Performed by: NURSE PRACTITIONER

## 2021-05-25 PROCEDURE — 99244 OFF/OP CNSLTJ NEW/EST MOD 40: CPT | Performed by: NURSE PRACTITIONER

## 2021-05-25 PROCEDURE — 3078F DIAST BP <80 MM HG: CPT | Performed by: NURSE PRACTITIONER

## 2021-05-25 RX ORDER — PANTOPRAZOLE SODIUM 40 MG/1
40 TABLET, DELAYED RELEASE ORAL
Qty: 30 TABLET | Refills: 1 | Status: SHIPPED | OUTPATIENT
Start: 2021-05-25 | End: 2021-07-29

## 2021-05-25 NOTE — PATIENT INSTRUCTIONS
-labs to exclude celiac, inflammatory markers  -ultrasound to eval for gallstones  -start trial of pantoprazole 40 mg once daily 30 min prior to first meal of the day  -reflux diet modification  -egd w/ mac w/ Dr. Virginia Cho  Dx: n/v, bloating    Tips to [facility] (ALIYA)

## 2021-05-25 NOTE — TELEPHONE ENCOUNTER
Scheduled for: D 41821   Provider Name: Dr Jeanne Curtis   Date: Thuan Pardo 6/15/2021     Location: [facility]   Sedation: MAC   Time: 9:30 am    Prep: Nothing after midnight the day before procedure and NPO 3 hrs prior procedure  Meds/Allergies Reconciled?: NKDA  Diagnosis

## 2021-05-27 ENCOUNTER — TELEPHONE (OUTPATIENT)
Dept: GASTROENTEROLOGY | Facility: CLINIC | Age: 31
End: 2021-05-27

## 2021-05-27 NOTE — TELEPHONE ENCOUNTER
Dipak Rojas requesting pantoprazole 40 MG rx for 90 day supply. Please sign pended rx orders if appropriate.      Thank you

## 2021-05-27 NOTE — TELEPHONE ENCOUNTER
Current Outpatient Medications   Medication Sig Dispense Refill   • Pantoprazole Sodium 40 MG Oral Tab EC Take 1 tablet (40 mg total) by mouth every morning before breakfast. 30 tablet 1     Per pharmacy 90 day supply request.

## 2021-06-01 RX ORDER — PANTOPRAZOLE SODIUM 40 MG/1
40 TABLET, DELAYED RELEASE ORAL
Qty: 90 TABLET | Refills: 1 | Status: SHIPPED | OUTPATIENT
Start: 2021-06-01 | End: 2021-07-01

## 2021-06-09 ENCOUNTER — TELEPHONE (OUTPATIENT)
Dept: FAMILY MEDICINE CLINIC | Facility: CLINIC | Age: 31
End: 2021-06-09

## 2021-06-09 ENCOUNTER — APPOINTMENT (OUTPATIENT)
Dept: GENERAL RADIOLOGY | Facility: HOSPITAL | Age: 31
End: 2021-06-09
Attending: NURSE PRACTITIONER
Payer: COMMERCIAL

## 2021-06-09 ENCOUNTER — HOSPITAL ENCOUNTER (EMERGENCY)
Facility: HOSPITAL | Age: 31
Discharge: HOME | End: 2021-06-10
Payer: COMMERCIAL

## 2021-06-09 DIAGNOSIS — J40 BRONCHITIS: Primary | ICD-10-CM

## 2021-06-09 PROCEDURE — 80048 BASIC METABOLIC PNL TOTAL CA: CPT | Performed by: NURSE PRACTITIONER

## 2021-06-09 PROCEDURE — 84484 ASSAY OF TROPONIN QUANT: CPT | Performed by: NURSE PRACTITIONER

## 2021-06-09 PROCEDURE — 93010 ELECTROCARDIOGRAM REPORT: CPT | Performed by: INTERNAL MEDICINE

## 2021-06-09 PROCEDURE — 99284 EMERGENCY DEPT VISIT MOD MDM: CPT

## 2021-06-09 PROCEDURE — 71045 X-RAY EXAM CHEST 1 VIEW: CPT | Performed by: NURSE PRACTITIONER

## 2021-06-09 PROCEDURE — 85025 COMPLETE CBC W/AUTO DIFF WBC: CPT | Performed by: NURSE PRACTITIONER

## 2021-06-09 PROCEDURE — 93005 ELECTROCARDIOGRAM TRACING: CPT

## 2021-06-10 VITALS
OXYGEN SATURATION: 100 % | SYSTOLIC BLOOD PRESSURE: 120 MMHG | HEART RATE: 79 BPM | TEMPERATURE: 98 F | BODY MASS INDEX: 24.11 KG/M2 | RESPIRATION RATE: 18 BRPM | HEIGHT: 66 IN | WEIGHT: 150 LBS | DIASTOLIC BLOOD PRESSURE: 79 MMHG

## 2021-06-10 PROCEDURE — 94640 AIRWAY INHALATION TREATMENT: CPT

## 2021-06-10 RX ORDER — ALBUTEROL SULFATE 90 UG/1
2 AEROSOL, METERED RESPIRATORY (INHALATION) EVERY 4 HOURS PRN
Qty: 1 EACH | Refills: 0 | Status: SHIPPED | OUTPATIENT
Start: 2021-06-10 | End: 2021-07-10

## 2021-06-10 RX ORDER — PREDNISONE 20 MG/1
20 TABLET ORAL DAILY
Qty: 5 TABLET | Refills: 0 | Status: SHIPPED | OUTPATIENT
Start: 2021-06-10 | End: 2021-06-15

## 2021-06-10 RX ORDER — IPRATROPIUM BROMIDE AND ALBUTEROL SULFATE 2.5; .5 MG/3ML; MG/3ML
3 SOLUTION RESPIRATORY (INHALATION) ONCE
Status: COMPLETED | OUTPATIENT
Start: 2021-06-10 | End: 2021-06-10

## 2021-06-10 NOTE — ED INITIAL ASSESSMENT (HPI)
Pt to ED for midsternal chest pressure onset today at 1700 with dry cough onset earlier this morning, denies fever. +lightheadedness and presyncopal multiple times today at work.

## 2021-06-10 NOTE — TELEPHONE ENCOUNTER
Message # [id number]         2021 09:43p   [HUI]  To:  From:  BIANCA Yadav MD:  Phone#:  ----------------------------------------------------------------------  Jayson Kim [phone]  89, RE UNCONTROLLABLE COUGH, HEAVY CHEST, SOB, DOE

## 2021-06-10 NOTE — ED PROVIDER NOTES
Patient Seen in: [facility] AND [facility] Emergency Department      History   Patient presents with:  Chest Pressure  Cough/URI    Stated Complaint:     HPI/Subjective:   30yo/f with hx of PCOS, Anxiety, Depression reports to the ED with chest pain, cough x 1 d 06/09/2021   SpO2 100%   BMI 24.21 kg/m²         Physical Exam  Vitals and nursing note reviewed. Constitutional:       General: She is not in acute distress. Appearance: She is well-developed. HENT:      Head: Normocephalic and atraumatic.    Eyes: -----------         ------                     CBC W/ DIFFERENTIAL[[id number]]          Abnormal            Final result                 Please view results for these tests on the individual orders.    RAINBOW DRAW LAVENDER   RAINBOW DRAW XIN

## 2021-06-11 ENCOUNTER — TELEMEDICINE (OUTPATIENT)
Dept: FAMILY MEDICINE CLINIC | Facility: CLINIC | Age: 31
End: 2021-06-11
Payer: COMMERCIAL

## 2021-06-11 DIAGNOSIS — J06.9 UPPER RESPIRATORY INFECTION, ACUTE: Primary | ICD-10-CM

## 2021-06-11 PROCEDURE — 99213 OFFICE O/P EST LOW 20 MIN: CPT | Performed by: FAMILY MEDICINE

## 2021-06-11 RX ORDER — AZITHROMYCIN 250 MG/1
TABLET, FILM COATED ORAL
Qty: 6 TABLET | Refills: 0 | Status: SHIPPED | OUTPATIENT
Start: 2021-06-11 | End: 2021-06-16

## 2021-06-11 NOTE — TELEPHONE ENCOUNTER
Patient page me on 6/ 9 with complains of having uncontrollable coughing. She was coughing over the phone. States symptoms started for couple days. Denies any fever chills but feels she is having difficulty breathing. Denies any history of asthma.   She

## 2021-06-11 NOTE — PROGRESS NOTES
HPI/Subjective:   Patient ID: Maninder Dai is a 36year old female.     Telehealth outside of [facility] [address] Verbal Consent   I conducted a telehealth visit with Maninder Dai today, 06/11/21, which was completed using two-way, real-time i History/Other:   Review of Systems     Constitutional: Negative. Negative for activity change, appetite change, diaphoresis and fatigue. Feeling weak. Respiratory: see above  Cardiovascular: Negative.   Negative for chest pain, palpitations defined types were placed in this encounter.       Meds This Visit:  Requested Prescriptions      No prescriptions requested or ordered in this encounter       Imaging & Referrals:  None

## 2021-06-12 ENCOUNTER — LAB REQUISITION (OUTPATIENT)
Dept: LAB | Facility: HOSPITAL | Age: 31
End: 2021-06-12
Payer: COMMERCIAL

## 2021-06-12 DIAGNOSIS — Z01.818 ENCOUNTER FOR OTHER PREPROCEDURAL EXAMINATION: ICD-10-CM

## 2021-06-25 ENCOUNTER — TELEPHONE (OUTPATIENT)
Dept: GASTROENTEROLOGY | Facility: CLINIC | Age: 31
End: 2021-06-25

## 2021-06-25 NOTE — TELEPHONE ENCOUNTER
Overdue reminder letter mailed out to patient.     Labs/Imaging:   C-REACTIVE PROTEIN   IMMUNOGLOBULIN A, QNANT   SED RATE, WESTCARLOSREN (AUTOMATED)   TISSUE TRANSGLUTAMINASE AB IGA   VITAMIN B12   US ABDOMEN COMPLETE

## 2021-06-28 ENCOUNTER — LAB ENCOUNTER (OUTPATIENT)
Dept: LAB | Facility: HOSPITAL | Age: 31
End: 2021-06-28
Attending: NURSE PRACTITIONER
Payer: COMMERCIAL

## 2021-06-28 ENCOUNTER — TELEPHONE (OUTPATIENT)
Dept: GASTROENTEROLOGY | Facility: CLINIC | Age: 31
End: 2021-06-28

## 2021-06-28 DIAGNOSIS — R11.2 NAUSEA AND VOMITING, INTRACTABILITY OF VOMITING NOT SPECIFIED, UNSPECIFIED VOMITING TYPE: ICD-10-CM

## 2021-06-28 DIAGNOSIS — R11.2 NAUSEA AND VOMITING, INTRACTABILITY OF VOMITING NOT SPECIFIED, UNSPECIFIED VOMITING TYPE: Primary | ICD-10-CM

## 2021-06-28 DIAGNOSIS — R14.0 BLOATING: ICD-10-CM

## 2021-06-28 DIAGNOSIS — R14.2 BELCHING: ICD-10-CM

## 2021-06-28 LAB
CRP SERPL-MCNC: <0.29 MG/DL (ref ?–0.3)
ERYTHROCYTE [SEDIMENTATION RATE] IN BLOOD: 5 MM/HR
IGA SERPL-MCNC: 384 MG/DL (ref 70–312)
VIT B12 SERPL-MCNC: 372 PG/ML (ref 193–986)

## 2021-06-28 PROCEDURE — 36415 COLL VENOUS BLD VENIPUNCTURE: CPT

## 2021-06-28 PROCEDURE — 82784 ASSAY IGA/IGD/IGG/IGM EACH: CPT

## 2021-06-28 PROCEDURE — 83516 IMMUNOASSAY NONANTIBODY: CPT

## 2021-06-28 PROCEDURE — 85652 RBC SED RATE AUTOMATED: CPT

## 2021-06-28 PROCEDURE — 86140 C-REACTIVE PROTEIN: CPT

## 2021-06-28 PROCEDURE — 82607 VITAMIN B-12: CPT

## 2021-06-28 NOTE — TELEPHONE ENCOUNTER
Per review on chart, patient originally scheduled for egd with Dr. Ingrid Ruiz 06/15/2021. Unable to find operative report. Spoke with Prince Toledo who informed she was hospitalized prior to egd and missed scheduled egd. Calling in now to re-schedule.      Rout

## 2021-06-28 NOTE — TELEPHONE ENCOUNTER
GI Schedulers-    Please assist with re-scheduling egd. Patient was hospitalized for original egd and missed procedure. Attached orders below.  Thank you!     egd w/ vicky w/ Dr. Leslie Jessica  Dx: n/v, myrna

## 2021-06-29 LAB — TTG IGA SER-ACNC: 1.1 U/ML (ref ?–7)

## 2021-07-06 NOTE — TELEPHONE ENCOUNTER
Scheduled for:  EGD 26831  Provider Name:  Dr. Alda Ace  Date:  From:06/15/2021   To:08/03/2021  Location:  [facility]   Sedation:  MAC  Time:  From:9:30am   To:11:30am, (pt is aware [facility] will call with arrival time)    Prep:  NPO after midnight   Meds/Allergies Re

## 2021-07-26 ENCOUNTER — HOSPITAL ENCOUNTER (OUTPATIENT)
Age: 31
Discharge: HOME | End: 2021-07-26
Attending: EMERGENCY MEDICINE
Payer: COMMERCIAL

## 2021-07-26 VITALS
TEMPERATURE: 97 F | RESPIRATION RATE: 18 BRPM | DIASTOLIC BLOOD PRESSURE: 69 MMHG | OXYGEN SATURATION: 96 % | HEART RATE: 64 BPM | SYSTOLIC BLOOD PRESSURE: 118 MMHG

## 2021-07-26 DIAGNOSIS — S05.01XA ABRASION OF RIGHT CORNEA, INITIAL ENCOUNTER: Primary | ICD-10-CM

## 2021-07-26 PROCEDURE — 99213 OFFICE O/P EST LOW 20 MIN: CPT

## 2021-07-26 RX ORDER — IBUPROFEN 600 MG/1
600 TABLET ORAL ONCE
Status: COMPLETED | OUTPATIENT
Start: 2021-07-26 | End: 2021-07-26

## 2021-07-26 RX ORDER — OFLOXACIN 3 MG/ML
2 SOLUTION/ DROPS OPHTHALMIC 4 TIMES DAILY
Qty: 1 EACH | Refills: 0 | Status: SHIPPED | OUTPATIENT
Start: 2021-07-26 | End: 2021-07-29

## 2021-07-26 NOTE — ED PROVIDER NOTES
Patient Seen in: [facility]      History   Patient presents with: Eye Visual Problem    Stated Complaint: right eye scratch    HPI/Subjective:   HPI    61-year-old female up-to-date on tetanus presents for evaluation for right eye pain.   She nursing note reviewed. Constitutional:       Appearance: Normal appearance. She is well-developed. She is not ill-appearing or diaphoretic. HENT:      Head: Normocephalic and atraumatic.       Right Ear: External ear normal.      Left Ear: External ear prior medical records for any recent pertinent discharge summaries, testing, and procedures, and reviewed those reports. Clinical impression as well as any lab results and radiology findings were discussed with the patient and/or caregiver.  I personally
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
## 2021-07-29 ENCOUNTER — OFFICE VISIT (OUTPATIENT)
Dept: FAMILY MEDICINE CLINIC | Facility: CLINIC | Age: 31
End: 2021-07-29
Payer: COMMERCIAL

## 2021-07-29 ENCOUNTER — NURSE TRIAGE (OUTPATIENT)
Dept: FAMILY MEDICINE CLINIC | Facility: CLINIC | Age: 31
End: 2021-07-29

## 2021-07-29 ENCOUNTER — LAB ENCOUNTER (OUTPATIENT)
Dept: LAB | Facility: HOSPITAL | Age: 31
End: 2021-07-29
Attending: FAMILY MEDICINE
Payer: COMMERCIAL

## 2021-07-29 VITALS — WEIGHT: 161 LBS | BODY MASS INDEX: 25.88 KG/M2 | HEIGHT: 66 IN | TEMPERATURE: 97 F

## 2021-07-29 DIAGNOSIS — R23.3 SPONTANEOUS BRUISING: ICD-10-CM

## 2021-07-29 DIAGNOSIS — R23.3 SPONTANEOUS BRUISING: Primary | ICD-10-CM

## 2021-07-29 LAB
APTT PPP: 30.7 SECONDS (ref 23.2–35.3)
BASOPHILS # BLD AUTO: 0.05 X10(3) UL (ref 0–0.2)
BASOPHILS NFR BLD AUTO: 0.6 %
DEPRECATED RDW RBC AUTO: 38.3 FL (ref 35.1–46.3)
EOSINOPHIL # BLD AUTO: 0.24 X10(3) UL (ref 0–0.7)
EOSINOPHIL NFR BLD AUTO: 3 %
ERYTHROCYTE [DISTWIDTH] IN BLOOD BY AUTOMATED COUNT: 11.9 % (ref 11–15)
HCT VFR BLD AUTO: 42.2 %
HGB BLD-MCNC: 13.9 G/DL
IMM GRANULOCYTES # BLD AUTO: 0.02 X10(3) UL (ref 0–1)
IMM GRANULOCYTES NFR BLD: 0.3 %
INR BLD: 0.94 (ref 0.9–1.2)
LYMPHOCYTES # BLD AUTO: 2.58 X10(3) UL (ref 1–4)
LYMPHOCYTES NFR BLD AUTO: 32.6 %
MCH RBC QN AUTO: 29 PG (ref 26–34)
MCHC RBC AUTO-ENTMCNC: 32.9 G/DL (ref 31–37)
MCV RBC AUTO: 88.1 FL
MONOCYTES # BLD AUTO: 0.6 X10(3) UL (ref 0.1–1)
MONOCYTES NFR BLD AUTO: 7.6 %
NEUTROPHILS # BLD AUTO: 4.43 X10 (3) UL (ref 1.5–7.7)
NEUTROPHILS # BLD AUTO: 4.43 X10(3) UL (ref 1.5–7.7)
NEUTROPHILS NFR BLD AUTO: 55.9 %
PLATELET # BLD AUTO: 338 10(3)UL (ref 150–450)
PROTHROMBIN TIME: 12.4 SECONDS (ref 11.8–14.5)
RBC # BLD AUTO: 4.79 X10(6)UL
WBC # BLD AUTO: 7.9 X10(3) UL (ref 4–11)

## 2021-07-29 PROCEDURE — 99214 OFFICE O/P EST MOD 30 MIN: CPT | Performed by: FAMILY MEDICINE

## 2021-07-29 PROCEDURE — 3008F BODY MASS INDEX DOCD: CPT | Performed by: FAMILY MEDICINE

## 2021-07-29 PROCEDURE — 36415 COLL VENOUS BLD VENIPUNCTURE: CPT

## 2021-07-29 PROCEDURE — 85730 THROMBOPLASTIN TIME PARTIAL: CPT

## 2021-07-29 PROCEDURE — 85025 COMPLETE CBC W/AUTO DIFF WBC: CPT

## 2021-07-29 PROCEDURE — 85610 PROTHROMBIN TIME: CPT

## 2021-07-29 RX ORDER — ATOMOXETINE 40 MG/1
CAPSULE ORAL
COMMUNITY
Start: 2021-07-24 | End: 2021-09-15

## 2021-07-29 RX ORDER — TRAZODONE HYDROCHLORIDE 50 MG/1
50 TABLET ORAL NIGHTLY PRN
COMMUNITY
Start: 2021-06-19 | End: 2021-09-15

## 2021-07-29 RX ORDER — LAMOTRIGINE 100 MG/1
TABLET ORAL
COMMUNITY
Start: 2021-07-26 | End: 2021-09-15

## 2021-07-29 RX ORDER — SERTRALINE HYDROCHLORIDE 100 MG/1
TABLET, FILM COATED ORAL
COMMUNITY
Start: 2021-07-24
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
## 2021-07-29 NOTE — PROGRESS NOTES
HPI:    Patient ID: Ned Montano is a 36year old female.     HPI  Patient presents with:  Eye Problem: left eye bruise since Tuesday and also bruising easy on arms and legs no pain       Patient here with c/o spontaneous bruising under left eye sinc and also bruising easy on arms and legs no pain      Physical Exam  Skin:     Findings: Rash present. Comments: Bruising noted under left eyelid.   Also some spontaneous bruising on both arms    Patient did not want me to examine her legs   Neurologica

## 2021-08-03 ENCOUNTER — SURGERY CENTER DOCUMENTATION (OUTPATIENT)
Dept: SURGERY | Age: 31
End: 2021-08-03

## 2021-08-03 ENCOUNTER — LAB REQUISITION (OUTPATIENT)
Dept: SURGERY | Age: 31
End: 2021-08-03
Payer: COMMERCIAL

## 2021-08-03 DIAGNOSIS — R14.0 ABDOMINAL DISTENSION: ICD-10-CM

## 2021-08-03 DIAGNOSIS — R11.2 NAUSEA AND VOMITING: ICD-10-CM

## 2021-08-03 PROCEDURE — 88305 TISSUE EXAM BY PATHOLOGIST: CPT | Performed by: INTERNAL MEDICINE

## 2021-08-03 PROCEDURE — 88312 SPECIAL STAINS GROUP 1: CPT | Performed by: INTERNAL MEDICINE

## 2021-08-03 PROCEDURE — 43239 EGD BIOPSY SINGLE/MULTIPLE: CPT | Performed by: INTERNAL MEDICINE

## 2021-08-03 NOTE — PROCEDURES
ESOPHAGOGASTRODUODENOSCOPY (EGD) REPORT    Odalys Saez     1990 Age 36year old   PCP Joseph Holly MD Endoscopist Lashawn Khan MD       Date of procedure: 21    Procedure: EGD w/ biopsies    Pre-operative diagnosis: nausea, vomiting erythema    Recommend:  1. Stop marijuana  2. Reflux precautions  3. Await pathology  4.  Follow up in clinic with Naima Collins    >>>If tissue was sampled/removed and you have not received your pathology results either by phone or letter within 2 weeks

## 2021-08-16 ENCOUNTER — TELEPHONE (OUTPATIENT)
Dept: GASTROENTEROLOGY | Facility: CLINIC | Age: 31
End: 2021-08-16

## 2021-08-16 NOTE — TELEPHONE ENCOUNTER
----- Message from Nelia Castleman, MD sent at 8/15/2021  2:58 PM CDT -----  Follow up with Starr Moraes

## 2021-09-01 ENCOUNTER — HOSPITAL ENCOUNTER (OUTPATIENT)
Age: 31
Discharge: HOME | End: 2021-09-01
Attending: EMERGENCY MEDICINE
Payer: MEDICAID

## 2021-09-01 VITALS
OXYGEN SATURATION: 100 % | HEART RATE: 67 BPM | RESPIRATION RATE: 18 BRPM | TEMPERATURE: 98 F | DIASTOLIC BLOOD PRESSURE: 65 MMHG | SYSTOLIC BLOOD PRESSURE: 111 MMHG

## 2021-09-01 DIAGNOSIS — R21 RASH: Primary | ICD-10-CM

## 2021-09-01 PROCEDURE — 99212 OFFICE O/P EST SF 10 MIN: CPT

## 2021-09-01 RX ORDER — DIAPER,BRIEF,INFANT-TODD,DISP
1 EACH MISCELLANEOUS 3 TIMES DAILY
Qty: 56 G | Refills: 0 | Status: SHIPPED | OUTPATIENT
Start: 2021-09-01 | End: 2021-09-08

## 2021-09-01 NOTE — ED PROVIDER NOTES
Patient Seen in: [facility]      History   Patient presents with:  Rash Skin Problem    Stated Complaint: rash     HPI/Subjective:   HPI    36year old female with a past medical history of irregular menstruation, anxiety, bipolar disorder, d Pulse 67   Temp 97.8 °F (36.6 °C) (Temporal)   Resp 18   LMP 06/09/2021   SpO2 100%         Physical Exam  Vitals and nursing note reviewed. Constitutional:       General: She is not in acute distress. Appearance: She is well-developed.  She is not [facility] hydroxyzine as needed.       Disposition and Plan     Clinical Impression:  Rash  (primary encounter diagnosis)     Disposition:  Discharge  9/1/2021 10:43 am    Follow-up:  Sixto Farley [address]  [facility]  [phone]    Schedule

## 2021-09-01 NOTE — ED INITIAL ASSESSMENT (HPI)
Patient with 4 day hx of red itchy rash that initially started around her ankles. Now with rash to arms and trunk. Denies use of new soaps, lotions, detergents, medications.

## 2021-09-15 ENCOUNTER — TELEPHONE (OUTPATIENT)
Dept: FAMILY MEDICINE CLINIC | Facility: CLINIC | Age: 31
End: 2021-09-15

## 2021-09-15 NOTE — TELEPHONE ENCOUNTER
[facility] pharmacy is requesting clarification on directions.     sertraline (ZOLOFT) 50 MG Oral Tab

## 2021-10-05 NOTE — PROGRESS NOTES
Subjective:   Patient ID: Zach Gates is a 36year old female. Patient is here as not doing well    She stopped her medications so had exacerbation of her bipolar illness.    Does not have psychiatrist any more as he does not accept her insurance Abdominal:      General: There is no distension. Tenderness: There is no abdominal tenderness. Musculoskeletal:         General: Tenderness present. No deformity. Normal range of motion.    Neurological:      Deep Tendon Reflexes: Reflexes are norm

## 2021-11-30 ENCOUNTER — OFFICE VISIT (OUTPATIENT)
Dept: OBGYN CLINIC | Facility: CLINIC | Age: 31
End: 2021-11-30
Payer: COMMERCIAL

## 2021-11-30 VITALS
WEIGHT: 160.63 LBS | BODY MASS INDEX: 25.81 KG/M2 | DIASTOLIC BLOOD PRESSURE: 83 MMHG | SYSTOLIC BLOOD PRESSURE: 125 MMHG | HEIGHT: 66 IN | HEART RATE: 73 BPM

## 2021-11-30 DIAGNOSIS — N82.3 RECTAL VAGINAL FISTULA: ICD-10-CM

## 2021-11-30 DIAGNOSIS — N91.2 AMENORRHEA: Primary | ICD-10-CM

## 2021-11-30 DIAGNOSIS — Z11.3 SCREEN FOR STD (SEXUALLY TRANSMITTED DISEASE): ICD-10-CM

## 2021-11-30 DIAGNOSIS — Z30.09 FAMILY PLANNING COUNSELING: ICD-10-CM

## 2021-11-30 DIAGNOSIS — Z87.42 HISTORY OF PCOS: ICD-10-CM

## 2021-11-30 PROCEDURE — 3008F BODY MASS INDEX DOCD: CPT | Performed by: ADVANCED PRACTICE MIDWIFE

## 2021-11-30 PROCEDURE — 99213 OFFICE O/P EST LOW 20 MIN: CPT | Performed by: ADVANCED PRACTICE MIDWIFE

## 2021-11-30 PROCEDURE — 3079F DIAST BP 80-89 MM HG: CPT | Performed by: ADVANCED PRACTICE MIDWIFE

## 2021-11-30 PROCEDURE — 81025 URINE PREGNANCY TEST: CPT | Performed by: ADVANCED PRACTICE MIDWIFE

## 2021-11-30 PROCEDURE — 3074F SYST BP LT 130 MM HG: CPT | Performed by: ADVANCED PRACTICE MIDWIFE

## 2021-11-30 RX ORDER — MEDROXYPROGESTERONE ACETATE 10 MG/1
10 TABLET ORAL DAILY
Qty: 5 TABLET | Refills: 0 | Status: SHIPPED | OUTPATIENT
Start: 2021-11-30 | End: 2021-12-05

## 2021-11-30 RX ORDER — LEVONORGESTREL / ETHINYL ESTRADIOL AND ETHINYL ESTRADIOL 150-30(84)
KIT ORAL
Qty: 91 TABLET | Refills: 3 | Status: SHIPPED | OUTPATIENT
Start: 2021-11-30

## 2021-12-04 NOTE — PROGRESS NOTES
Subjective:   Patient ID: Jo Ann Plaza is a 36year old female who presents for new form of contraception.   Menses: LMP April pt has known PCOS  Sexual activity: male & female partners   Contraception / STI transmission: condoms  Denies excessive ET mg by mouth daily. (Patient not taking: Reported on 11/30/2021)       Allergies:No Known Allergies    Objective:   Physical Exam  Vitals reviewed. Constitutional:       General: She is not in acute distress. Appearance: Normal appearance.  She is no

## 2021-12-24 ENCOUNTER — HOSPITAL ENCOUNTER (OUTPATIENT)
Age: 31
Discharge: HOME | End: 2021-12-24
Payer: COMMERCIAL

## 2021-12-24 VITALS
SYSTOLIC BLOOD PRESSURE: 125 MMHG | DIASTOLIC BLOOD PRESSURE: 80 MMHG | RESPIRATION RATE: 18 BRPM | HEART RATE: 78 BPM | TEMPERATURE: 98 F | OXYGEN SATURATION: 99 %

## 2021-12-24 DIAGNOSIS — Z20.822 LAB TEST NEGATIVE FOR COVID-19 VIRUS: ICD-10-CM

## 2021-12-24 DIAGNOSIS — B34.9 VIRAL ILLNESS: Primary | ICD-10-CM

## 2021-12-24 PROCEDURE — 99213 OFFICE O/P EST LOW 20 MIN: CPT

## 2021-12-24 PROCEDURE — 87880 STREP A ASSAY W/OPTIC: CPT

## 2021-12-24 NOTE — ED PROVIDER NOTES
Patient Seen in: [facility]      History   Patient presents with:  Covid-19 Test    Stated Complaint: Covid Testing    Subjective:   HPI    This is a 21-year-old female presenting with fatigue sore throat slight cough and headache fully vacci Pupils are equal, round, and reactive to light. Cardiovascular:      Rate and Rhythm: Normal rate. Pulmonary:      Effort: Pulmonary effort is normal. No respiratory distress. Breath sounds: Normal breath sounds. No wheezing.    Abdominal:      Gen

## 2022-01-03 ENCOUNTER — HOSPITAL ENCOUNTER (OUTPATIENT)
Age: 32
Discharge: HOME | End: 2022-01-03
Payer: COMMERCIAL

## 2022-01-03 VITALS
HEART RATE: 109 BPM | RESPIRATION RATE: 20 BRPM | OXYGEN SATURATION: 98 % | DIASTOLIC BLOOD PRESSURE: 76 MMHG | SYSTOLIC BLOOD PRESSURE: 123 MMHG | TEMPERATURE: 98 F

## 2022-01-03 DIAGNOSIS — B34.9 VIRAL ILLNESS: Primary | ICD-10-CM

## 2022-01-03 LAB — S PYO AG THROAT QL: NEGATIVE

## 2022-01-03 PROCEDURE — 99213 OFFICE O/P EST LOW 20 MIN: CPT

## 2022-01-03 PROCEDURE — 99212 OFFICE O/P EST SF 10 MIN: CPT

## 2022-01-03 PROCEDURE — 87880 STREP A ASSAY W/OPTIC: CPT

## 2022-01-03 NOTE — ED INITIAL ASSESSMENT (HPI)
3-4 days of chills, sweats, body aches, cough, congestion, headache, and fatigue. + covid expsosure. Patient looking for rapid covid testing. Declines alinity testing.

## 2022-01-03 NOTE — ED PROVIDER NOTES
Patient Seen in: [facility]      History   Patient presents with:   Body ache and/or chills  Cough/URI    Stated Complaint: testing; sore throat,headache,body aches,cough     Subjective:   HPI    This is a 82-year-old female presenting with 3 Constitutional:       Appearance: Normal appearance. HENT:      Head: Normocephalic. Right Ear: Tympanic membrane normal.      Left Ear: Tympanic membrane normal.      Nose: Congestion present.       Mouth/Throat:      Mouth: Mucous membranes are m encounter diagnosis)     Disposition:  Discharge  1/3/2022  8:56 am    Follow-up:  Yossi Gabriel MD  [address] [place] [address]  [phone]      As needed          Medications Prescribed:  Current Discharge Medication List

## 2022-01-08 ENCOUNTER — HOSPITAL ENCOUNTER (OUTPATIENT)
Age: 32
Discharge: HOME | End: 2022-01-08
Payer: COMMERCIAL

## 2022-01-08 VITALS
HEART RATE: 78 BPM | TEMPERATURE: 97 F | SYSTOLIC BLOOD PRESSURE: 120 MMHG | DIASTOLIC BLOOD PRESSURE: 77 MMHG | RESPIRATION RATE: 19 BRPM | OXYGEN SATURATION: 97 %

## 2022-01-08 DIAGNOSIS — N30.90 CYSTITIS: Primary | ICD-10-CM

## 2022-01-08 LAB
B-HCG UR QL: NEGATIVE
CLARITY UR: CLEAR
COLOR UR: YELLOW
GLUCOSE UR STRIP-MCNC: NEGATIVE MG/DL
KETONES UR STRIP-MCNC: 80 MG/DL
NITRITE UR QL STRIP: NEGATIVE
PH UR STRIP: 5 [PH]
SP GR UR STRIP: 1.01
UROBILINOGEN UR STRIP-ACNC: <2 MG/DL

## 2022-01-08 PROCEDURE — 99213 OFFICE O/P EST LOW 20 MIN: CPT

## 2022-01-08 PROCEDURE — 81002 URINALYSIS NONAUTO W/O SCOPE: CPT

## 2022-01-08 PROCEDURE — 81025 URINE PREGNANCY TEST: CPT

## 2022-01-08 RX ORDER — CEPHALEXIN 500 MG/1
500 CAPSULE ORAL 2 TIMES DAILY
Qty: 14 CAPSULE | Refills: 0 | Status: SHIPPED | OUTPATIENT
Start: 2022-01-08 | End: 2022-01-08

## 2022-01-08 RX ORDER — CEPHALEXIN 500 MG/1
500 CAPSULE ORAL 2 TIMES DAILY
Qty: 14 CAPSULE | Refills: 0 | Status: SHIPPED | OUTPATIENT
Start: 2022-01-08 | End: 2022-01-15

## 2022-01-08 NOTE — ED PROVIDER NOTES
Patient presents with:  Urinary Symptoms      HPI:     Teresa Moon is a 36year old female who presents with a chief complaint of dysuria, urgency, and frequency that started a few days ago. She states she has had a few episodes of hematuria.   She Concern: Not Asked        Stress Concern: Not Asked        Weight Concern: No        Special Diet: No        Back Care: Not Asked        Exercise: No        Bike Helmet: Not Asked        Seat Belt: Yes        Self-Exams: Not Asked    Social History Bandar Savage Gravity, Urine 1.015 1.005 - 1.030    PH, Urine 5.0 5.0 - 8.0    Protein urine Trace (A) Negative mg/dL    Glucose, Urine Negative Negative mg/dL    Ketone, Urine 80  (A) Negative mg/dL    Bilirubin, Urine Small (A) Negative    Blood, Urine Small (A) Negat

## 2022-01-12 ENCOUNTER — OFFICE VISIT (OUTPATIENT)
Dept: OBGYN CLINIC | Facility: CLINIC | Age: 32
End: 2022-01-12
Payer: COMMERCIAL

## 2022-01-12 VITALS
WEIGHT: 155.81 LBS | HEART RATE: 111 BPM | BODY MASS INDEX: 25 KG/M2 | DIASTOLIC BLOOD PRESSURE: 77 MMHG | SYSTOLIC BLOOD PRESSURE: 120 MMHG

## 2022-01-12 DIAGNOSIS — Z32.00 PREGNANCY EXAMINATION OR TEST, PREGNANCY UNCONFIRMED: ICD-10-CM

## 2022-01-12 DIAGNOSIS — N92.6 IRREGULAR BLEEDING: ICD-10-CM

## 2022-01-12 DIAGNOSIS — Z11.3 SCREEN FOR STD (SEXUALLY TRANSMITTED DISEASE): Primary | ICD-10-CM

## 2022-01-12 PROBLEM — N82.3 RECTAL VAGINAL FISTULA: Status: ACTIVE | Noted: 2017-11-10

## 2022-01-12 LAB
CONTROL LINE PRESENT WITH A CLEAR BACKGROUND (YES/NO): YES YES/NO
PREGNANCY TEST, URINE: NEGATIVE

## 2022-01-12 PROCEDURE — 3074F SYST BP LT 130 MM HG: CPT | Performed by: ADVANCED PRACTICE MIDWIFE

## 2022-01-12 PROCEDURE — 99213 OFFICE O/P EST LOW 20 MIN: CPT | Performed by: ADVANCED PRACTICE MIDWIFE

## 2022-01-12 PROCEDURE — 3078F DIAST BP <80 MM HG: CPT | Performed by: ADVANCED PRACTICE MIDWIFE

## 2022-01-12 PROCEDURE — 81025 URINE PREGNANCY TEST: CPT | Performed by: ADVANCED PRACTICE MIDWIFE

## 2022-01-12 NOTE — PROGRESS NOTES
Vinicio Duvall is a 36year old here for a problem gyn visit. She reports vaginal spotting with wiping, that comes and goes every few days for the past 2-3 weeks. Does not need a panty liner for it. Notes cramping bilaterally with the spotting.  UTI s/

## 2022-01-13 ENCOUNTER — TELEPHONE (OUTPATIENT)
Dept: OBGYN CLINIC | Facility: CLINIC | Age: 32
End: 2022-01-13

## 2022-01-13 LAB
C TRACH DNA SPEC QL NAA+PROBE: NEGATIVE
N GONORRHOEA DNA SPEC QL NAA+PROBE: NEGATIVE

## 2022-01-13 RX ORDER — METRONIDAZOLE 7.5 MG/G
1 GEL VAGINAL NIGHTLY
Qty: 1 EACH | Refills: 0 | Status: SHIPPED
Start: 2022-01-13 | End: 2022-01-18

## 2022-01-13 RX ORDER — FLUCONAZOLE 150 MG/1
150 TABLET ORAL ONCE
Qty: 2 TABLET | Refills: 0 | Status: SHIPPED
Start: 2022-01-13 | End: 2022-01-13

## 2022-01-13 NOTE — TELEPHONE ENCOUNTER
----- Message from Lobito El CNM sent at 1/13/2022 12:13 PM CST -----  Positive glenny BV.   I placed an order for Metrogel and a Diflucan since she is also on abx for a UTI  Please call

## 2022-01-14 LAB
GENITAL VAGINOSIS SCREEN: POSITIVE
TRICHOMONAS SCREEN: NEGATIVE

## 2022-01-17 LAB — T VAGINALIS RRNA SPEC QL NAA+PROBE: NEGATIVE

## 2022-06-14 ENCOUNTER — HOSPITAL ENCOUNTER (OUTPATIENT)
Age: 32
Discharge: HOME | End: 2022-06-14

## 2022-06-14 VITALS
RESPIRATION RATE: 20 BRPM | DIASTOLIC BLOOD PRESSURE: 76 MMHG | HEART RATE: 95 BPM | TEMPERATURE: 98 F | SYSTOLIC BLOOD PRESSURE: 117 MMHG | OXYGEN SATURATION: 98 %

## 2022-06-14 DIAGNOSIS — R21 RASH: Primary | ICD-10-CM

## 2022-06-14 PROCEDURE — 99213 OFFICE O/P EST LOW 20 MIN: CPT

## 2022-06-14 RX ORDER — PERMETHRIN 50 MG/G
1 CREAM TOPICAL ONCE
Qty: 60 G | Refills: 0 | Status: SHIPPED | OUTPATIENT
Start: 2022-06-14 | End: 2022-06-14

## 2022-06-14 RX ORDER — PREDNISONE 20 MG/1
40 TABLET ORAL DAILY
Qty: 10 TABLET | Refills: 0 | Status: SHIPPED | OUTPATIENT
Start: 2022-06-14 | End: 2022-06-19

## 2022-06-14 NOTE — ED INITIAL ASSESSMENT (HPI)
Rash all over her body for 36 hours,mild itching, r pelvic pain for few hours, denies urinary symptoms, h/o PCOS

## 2022-08-19 ENCOUNTER — HOSPITAL ENCOUNTER (EMERGENCY)
Facility: HOSPITAL | Age: 32
Discharge: HOME | End: 2022-08-19
Attending: EMERGENCY MEDICINE
Payer: MEDICAID

## 2022-08-19 VITALS
BODY MASS INDEX: 20.89 KG/M2 | SYSTOLIC BLOOD PRESSURE: 128 MMHG | HEIGHT: 66 IN | DIASTOLIC BLOOD PRESSURE: 68 MMHG | RESPIRATION RATE: 18 BRPM | HEART RATE: 88 BPM | WEIGHT: 130 LBS | OXYGEN SATURATION: 99 %

## 2022-08-19 DIAGNOSIS — Z65.8 SOCIAL DISCORD: Primary | ICD-10-CM

## 2022-08-19 PROCEDURE — 99283 EMERGENCY DEPT VISIT LOW MDM: CPT

## 2022-08-19 NOTE — ED INITIAL ASSESSMENT (HPI)
Patient here today after being evicted from [facility]. Per patient she needs resources for housing. Patient was supposed to be out by 11am today and requested an additional hour to get her stuff out and was denied. Per patient she was walking down the street crying and police saw her, she explained that she had no where to go, so they sent her here for resources.  Patient denies SI/HI

## 2022-08-19 NOTE — CM/SW NOTE
EDCM contacted to provide patient with homeless resources. Pt provided with emergency shelter information and [facility] hotel listing. Pt satisfied with resources and had no further questions.

## 2022-08-30 RX ORDER — SERTRALINE HYDROCHLORIDE 100 MG/1
100 TABLET, FILM COATED ORAL EVERY MORNING
Qty: 30 TABLET | Refills: 0 | Status: SHIPPED | OUTPATIENT
Start: 2022-08-30

## 2022-08-30 RX ORDER — DEXTROAMPHETAMINE SACCHARATE, AMPHETAMINE ASPARTATE, DEXTROAMPHETAMINE SULFATE AND AMPHETAMINE SULFATE 7.5; 7.5; 7.5; 7.5 MG/1; MG/1; MG/1; MG/1
30 TABLET ORAL DAILY
Qty: 30 TABLET | Refills: 0 | Status: SHIPPED | OUTPATIENT
Start: 2022-08-30

## 2022-08-30 NOTE — TELEPHONE ENCOUNTER
Patient just made an appointment 9/6/22 due to insurance issues could not come in sooner.  Would like to see if she can get refills of medication :     Adderall  Zoloft (generic)       Pharm : [address]

## 2022-08-31 RX ORDER — SERTRALINE HYDROCHLORIDE 100 MG/1
100 TABLET, FILM COATED ORAL EVERY MORNING
Qty: 30 TABLET | Refills: 0 | OUTPATIENT
Start: 2022-08-31

## 2022-08-31 RX ORDER — DEXTROAMPHETAMINE SACCHARATE, AMPHETAMINE ASPARTATE, DEXTROAMPHETAMINE SULFATE AND AMPHETAMINE SULFATE 7.5; 7.5; 7.5; 7.5 MG/1; MG/1; MG/1; MG/1
30 TABLET ORAL DAILY
Qty: 30 TABLET | Refills: 0 | OUTPATIENT
Start: 2022-08-31

## 2022-08-31 RX ORDER — ARIPIPRAZOLE 10 MG/1
TABLET ORAL
Qty: 30 TABLET | Refills: 1 | Status: SHIPPED | OUTPATIENT
Start: 2022-08-31

## 2022-08-31 NOTE — TELEPHONE ENCOUNTER
Please review; protocol failed/No Protcol    Requested Prescriptions   Pending Prescriptions Disp Refills    ARIPiprazole (ABILIFY) 10 MG Oral Tab 30 tablet 1     Si po every day        There is no refill protocol information for this order       Refused Prescriptions Disp Refills    amphetamine-dextroamphetamine (ADDERALL) 30 MG Oral Tab 30 tablet 0     Sig: Take 1 tablet (30 mg total) by mouth daily. There is no refill protocol information for this order        sertraline 100 MG Oral Tab 30 tablet 0     Sig: Take 1 tablet (100 mg total) by mouth every morning.         Psychiatric Non-Scheduled (Anti-Anxiety) Passed - 2022  1:25 PM        Passed - In person appointment or virtual visit in the past 6 mos or appointment in next 3 mos       Recent Outpatient Visits              4 months ago Bipolar disorder in full remission, most recent episode unspecified type [facility])    [place] [employer] [place] [facility] [facility], [address] Cheung MD    Office Visit    7 months ago Screen for STD (sexually transmitted disease)    [facility], [address], [address] [facility]    Office Visit    9 months ago [address] [facility], [address], [address] [facility]    Office Visit    11 months ago Other depression    Carole Crump MD    Office Visit    1 year ago Spontaneous bruising    Karen Santos MD    Office Visit     Future Appointments         Provider Department Appt Notes    In 2 days Misty Vargas MD [place] [employer] [place] [facility], [address], [employer]                     Recent Outpatient Visits              4 months ago Bipolar disorder in full remission, most recent episode unspecified type [facility])    Carole Crump MD    Office Visit    7 months ago Screen for STD (sexually transmitted disease)    [facility] for [facility], [address], [address] [place], [place]    Office Visit    9 months ago [address] for [place], [address], [address] [facility] [employer] Visit    11 months ago Other depression    Tania Phillips MD    Office Visit    1 year ago Spontaneous bruising    Mica Martinez MD    Office Visit            Future Appointments         Provider Department Appt Notes    In 2 days Adriel Kinney MD [address] [facility], [address] [facility]

## 2022-09-02 ENCOUNTER — TELEPHONE (OUTPATIENT)
Dept: FAMILY MEDICINE CLINIC | Facility: CLINIC | Age: 32
End: 2022-09-02

## 2022-09-02 ENCOUNTER — OFFICE VISIT (OUTPATIENT)
Dept: FAMILY MEDICINE CLINIC | Facility: CLINIC | Age: 32
End: 2022-09-02
Payer: MEDICAID

## 2022-09-02 VITALS
WEIGHT: 125.38 LBS | SYSTOLIC BLOOD PRESSURE: 117 MMHG | BODY MASS INDEX: 20.15 KG/M2 | DIASTOLIC BLOOD PRESSURE: 78 MMHG | HEIGHT: 66 IN | HEART RATE: 76 BPM

## 2022-09-02 DIAGNOSIS — N76.1 SUBACUTE VAGINITIS: Primary | ICD-10-CM

## 2022-09-02 DIAGNOSIS — K60.4 RECTAL FISTULA: ICD-10-CM

## 2022-09-02 DIAGNOSIS — Z12.4 SCREENING FOR CERVICAL CANCER: ICD-10-CM

## 2022-09-02 DIAGNOSIS — Z00.00 ANNUAL PHYSICAL EXAM: ICD-10-CM

## 2022-09-02 PROCEDURE — 87624 HPV HI-RISK TYP POOLED RSLT: CPT | Performed by: FAMILY MEDICINE

## 2022-09-02 PROCEDURE — 87591 N.GONORRHOEAE DNA AMP PROB: CPT | Performed by: FAMILY MEDICINE

## 2022-09-02 PROCEDURE — 87491 CHLMYD TRACH DNA AMP PROBE: CPT | Performed by: FAMILY MEDICINE

## 2022-09-02 RX ORDER — NORGESTIMATE AND ETHINYL ESTRADIOL 7DAYSX3 LO
1 KIT ORAL DAILY
Qty: 84 TABLET | Refills: 1 | Status: SHIPPED | OUTPATIENT
Start: 2022-09-02 | End: 2023-08-28

## 2022-09-02 RX ORDER — METRONIDAZOLE 500 MG/1
500 TABLET ORAL 3 TIMES DAILY
Qty: 14 TABLET | Refills: 0 | Status: SHIPPED | OUTPATIENT
Start: 2022-09-02

## 2022-09-02 NOTE — TELEPHONE ENCOUNTER
Niels need clarification on medication - metRONIDAZOLE (FLAGYL) 500 MG Oral Tab. Would like to know if provider wants it to be quantity 21 or 30.

## 2022-09-06 ENCOUNTER — PATIENT MESSAGE (OUTPATIENT)
Dept: FAMILY MEDICINE CLINIC | Facility: CLINIC | Age: 32
End: 2022-09-06

## 2022-09-06 ENCOUNTER — TELEPHONE (OUTPATIENT)
Dept: FAMILY MEDICINE CLINIC | Facility: CLINIC | Age: 32
End: 2022-09-06

## 2022-09-06 LAB
C TRACH DNA SPEC QL NAA+PROBE: POSITIVE
HPV I/H RISK 1 DNA SPEC QL NAA+PROBE: NEGATIVE
N GONORRHOEA DNA SPEC QL NAA+PROBE: NEGATIVE

## 2022-09-06 RX ORDER — AZITHROMYCIN 1 G
1 PACKET (EA) ORAL ONCE
Qty: 1 EACH | Refills: 0 | Status: SHIPPED | OUTPATIENT
Start: 2022-09-06 | End: 2022-09-06

## 2022-09-06 RX ORDER — DEXTROAMPHETAMINE SACCHARATE, AMPHETAMINE ASPARTATE, DEXTROAMPHETAMINE SULFATE AND AMPHETAMINE SULFATE 7.5; 7.5; 7.5; 7.5 MG/1; MG/1; MG/1; MG/1
30 TABLET ORAL DAILY
Qty: 30 TABLET | Refills: 0 | Status: SHIPPED | OUTPATIENT
Start: 2022-09-06

## 2022-09-07 ENCOUNTER — TELEPHONE (OUTPATIENT)
Dept: FAMILY MEDICINE CLINIC | Facility: CLINIC | Age: 32
End: 2022-09-07

## 2022-09-29 NOTE — TELEPHONE ENCOUNTER
Please review. Protocol failed / No protocol. Requested Prescriptions   Pending Prescriptions Disp Refills    amphetamine-dextroamphetamine (ADDERALL) 30 MG Oral Tab 30 tablet 0     Sig: Take 1 tablet (30 mg total) by mouth daily.         There is no refill protocol information for this order           Recent Outpatient Visits              3 weeks ago Subacute vaginitis    [address], [address] [place] Cheung MD    Office Visit    5 months ago Bipolar disorder in full remission, most recent episode unspecified type [facility])    [address], [address] [place] Cheung MD    Office Visit    8 months ago Screen for STD (sexually transmitted disease)    [facility], [address], [address] [facility], [employer]    Office Visit    10 months ago [address] for [place], [address], [address] [facility], [facility] [employer] Visit    1 year ago Other depression    Susannah Forrest MD    Office Visit

## 2022-10-03 RX ORDER — DEXTROAMPHETAMINE SACCHARATE, AMPHETAMINE ASPARTATE, DEXTROAMPHETAMINE SULFATE AND AMPHETAMINE SULFATE 7.5; 7.5; 7.5; 7.5 MG/1; MG/1; MG/1; MG/1
30 TABLET ORAL DAILY
Qty: 30 TABLET | Refills: 0 | Status: SHIPPED | OUTPATIENT
Start: 2022-10-03

## 2022-11-03 RX ORDER — DEXTROAMPHETAMINE SACCHARATE, AMPHETAMINE ASPARTATE, DEXTROAMPHETAMINE SULFATE AND AMPHETAMINE SULFATE 7.5; 7.5; 7.5; 7.5 MG/1; MG/1; MG/1; MG/1
30 TABLET ORAL DAILY
Qty: 30 TABLET | Refills: 0 | Status: SHIPPED | OUTPATIENT
Start: 2022-11-03

## 2022-11-03 NOTE — TELEPHONE ENCOUNTER
Please review. Protocol failed/No protocol      Requested Prescriptions   Pending Prescriptions Disp Refills    amphetamine-dextroamphetamine (ADDERALL) 30 MG Oral Tab 30 tablet 0     Sig: Take 1 tablet (30 mg total) by mouth in the morning.        There is no refill protocol information for this order          Recent Outpatient Visits              2 months ago Subacute vaginitis    [address], [address] Cheung MD    Office Visit    6 months ago Bipolar disorder in full remission, most recent episode unspecified type [facility])    [address], [address] Cheung MD    Office Visit    9 months ago Screen for STD (sexually transmitted disease)    [facility], [address], [address] [facility], [place]    Office Visit    11 months ago [address] [facility] [address] [facility], [address], [address] [facility], [facility] [employer] Visit    1 year ago Other depression    Yg Hanna MD    Office Visit

## 2022-11-21 ENCOUNTER — TELEPHONE (OUTPATIENT)
Dept: FAMILY MEDICINE CLINIC | Facility: CLINIC | Age: 32
End: 2022-11-21

## 2022-11-28 NOTE — TELEPHONE ENCOUNTER
Please review. Protocol failed / No protocol. Requested Prescriptions   Pending Prescriptions Disp Refills    amphetamine-dextroamphetamine (ADDERALL) 30 MG Oral Tab 30 tablet 0     Sig: Take 1 tablet (30 mg total) by mouth daily.        There is no refill protocol information for this order          Recent Outpatient Visits              2 months ago Subacute vaginitis    [address], [address] Cheung MD    Office Visit    7 months ago Bipolar disorder in full remission, most recent episode unspecified type [facility])    [address], [address] Cheung MD    Office Visit    10 months ago Screen for STD (sexually transmitted disease)    [employer], [address], [address] [facility], [place]    Office Visit    12 months ago [address] [facility] [place], [address], [address], [place], [employer] Visit    1 year ago Other depression    Shannen Iniguez MD    Office Visit

## 2022-11-30 RX ORDER — DEXTROAMPHETAMINE SACCHARATE, AMPHETAMINE ASPARTATE, DEXTROAMPHETAMINE SULFATE AND AMPHETAMINE SULFATE 7.5; 7.5; 7.5; 7.5 MG/1; MG/1; MG/1; MG/1
30 TABLET ORAL DAILY
Qty: 30 TABLET | Refills: 0 | Status: SHIPPED | OUTPATIENT
Start: 2022-11-30

## 2022-12-01 RX ORDER — DEXTROAMPHETAMINE SACCHARATE, AMPHETAMINE ASPARTATE, DEXTROAMPHETAMINE SULFATE AND AMPHETAMINE SULFATE 7.5; 7.5; 7.5; 7.5 MG/1; MG/1; MG/1; MG/1
30 TABLET ORAL DAILY
Qty: 30 TABLET | Refills: 0 | Status: CANCELLED | OUTPATIENT
Start: 2022-12-01

## 2022-12-01 RX ORDER — DEXTROAMPHETAMINE SACCHARATE, AMPHETAMINE ASPARTATE, DEXTROAMPHETAMINE SULFATE AND AMPHETAMINE SULFATE 5; 5; 5; 5 MG/1; MG/1; MG/1; MG/1
20 TABLET ORAL DAILY
Qty: 30 TABLET | Refills: 0 | Status: SHIPPED | OUTPATIENT
Start: 2022-12-01 | End: 2022-12-31

## 2022-12-01 NOTE — TELEPHONE ENCOUNTER
Patient states [facility] has Adderall on backorder. [facility] has stock but only in 20mg (patient normally takes 30 mg). Patient would like to know if she can take 20mg this month so she at least as some medication. Patient is out of her medication at this time, sending on high.

## 2022-12-14 ENCOUNTER — TELEPHONE (OUTPATIENT)
Dept: FAMILY MEDICINE CLINIC | Facility: CLINIC | Age: 32
End: 2022-12-14

## 2022-12-14 NOTE — TELEPHONE ENCOUNTER
Patient reports she has been fatigued and has noticed more bruising then usual. She would like to know if the labs ordered will be helpful to determine the cause. Advised patient of labs ordered, if normal recommended she schedule a follow up visit to discuss further. Notified patient that she should fast 10-12 hours prior to the blood draw and hold any supplements with biotin in them for 3 days prior. Patient verbalized understanding.

## 2022-12-16 RX ORDER — DEXTROAMPHETAMINE SACCHARATE, AMPHETAMINE ASPARTATE, DEXTROAMPHETAMINE SULFATE AND AMPHETAMINE SULFATE 5; 5; 5; 5 MG/1; MG/1; MG/1; MG/1
20 TABLET ORAL DAILY
Qty: 30 TABLET | Refills: 0 | Status: CANCELLED | OUTPATIENT
Start: 2022-12-16 | End: 2023-01-15

## 2022-12-17 RX ORDER — DEXTROAMPHETAMINE SACCHARATE, AMPHETAMINE ASPARTATE, DEXTROAMPHETAMINE SULFATE AND AMPHETAMINE SULFATE 2.5; 2.5; 2.5; 2.5 MG/1; MG/1; MG/1; MG/1
10 TABLET ORAL DAILY
Qty: 30 TABLET | Refills: 0 | Status: CANCELLED | OUTPATIENT
Start: 2022-12-17 | End: 2023-01-16

## 2022-12-17 NOTE — TELEPHONE ENCOUNTER
Dr Willis Farfan, please advise  Licha Strange, (sent to provider in same office)     Patient is on Adderall 30mg daily but due to the national shortage she can only obtain 20mg or 10 mg. Patient was prescribed 20 mg on 12/1/22 but states she feels that she needs the additional 10 mg. Rx for adderall 10 mg was pended for review. Pharmacy and allergies verified.

## 2022-12-27 RX ORDER — DEXTROAMPHETAMINE SACCHARATE, AMPHETAMINE ASPARTATE, DEXTROAMPHETAMINE SULFATE AND AMPHETAMINE SULFATE 7.5; 7.5; 7.5; 7.5 MG/1; MG/1; MG/1; MG/1
30 TABLET ORAL DAILY
Qty: 30 TABLET | Refills: 0 | Status: CANCELLED | OUTPATIENT
Start: 2022-12-27

## 2022-12-27 RX ORDER — DEXTROAMPHETAMINE SACCHARATE, AMPHETAMINE ASPARTATE, DEXTROAMPHETAMINE SULFATE AND AMPHETAMINE SULFATE 7.5; 7.5; 7.5; 7.5 MG/1; MG/1; MG/1; MG/1
30 TABLET ORAL DAILY
Qty: 30 TABLET | Refills: 0 | Status: CANCELLED | OUTPATIENT
Start: 2023-01-27

## 2022-12-27 RX ORDER — DEXTROAMPHETAMINE SACCHARATE, AMPHETAMINE ASPARTATE, DEXTROAMPHETAMINE SULFATE AND AMPHETAMINE SULFATE 7.5; 7.5; 7.5; 7.5 MG/1; MG/1; MG/1; MG/1
30 TABLET ORAL DAILY
Qty: 30 TABLET | Refills: 0 | Status: CANCELLED | OUTPATIENT
Start: 2022-02-27

## 2022-12-27 NOTE — TELEPHONE ENCOUNTER
Pt following up on request.  Pt asking when this can be done as Pt is aware pharmacies are running low on adderrall. Pt states she does have supplies for December, Dr. Tate Carrillo increased dose to 30mg because 20mg was not helping. Informed Pt, she may receive a 'too soon to fill'. Pt states Dr. Tate Carrillo can approve an early refill. Verified new pharmacy. Informed Pt Dr. Tate Carrillo is seeing Patients back to back today, and has not reviewed her message. Pt verbalized understanding.

## 2022-12-28 RX ORDER — DEXTROAMPHETAMINE SACCHARATE, AMPHETAMINE ASPARTATE, DEXTROAMPHETAMINE SULFATE AND AMPHETAMINE SULFATE 2.5; 2.5; 2.5; 2.5 MG/1; MG/1; MG/1; MG/1
10 TABLET ORAL DAILY
Qty: 30 TABLET | Refills: 0 | Status: SHIPPED | OUTPATIENT
Start: 2022-12-28

## 2022-12-28 RX ORDER — DEXTROAMPHETAMINE SACCHARATE, AMPHETAMINE ASPARTATE, DEXTROAMPHETAMINE SULFATE AND AMPHETAMINE SULFATE 5; 5; 5; 5 MG/1; MG/1; MG/1; MG/1
20 TABLET ORAL DAILY
Qty: 30 TABLET | Refills: 0 | Status: SHIPPED | OUTPATIENT
Start: 2022-12-28 | End: 2023-01-27

## 2022-12-28 NOTE — TELEPHONE ENCOUNTER
Verified name and . Patient calling to follow up on request as seen below. She states that [employer] message can be sent when prescriptions have been signed off on.

## 2022-12-28 NOTE — TELEPHONE ENCOUNTER
The patient calling back to state they do not have the Adderall 30 mg tablets  in stock. They do have capsules. I could not find capsules in our system for Adderall 30 mg. She sated takes once a day. I called the pharmacy who stated that Adderall  XR 30 is what they have at the present time. They do have Adderall 20 mg and Adderall 10 mg in stock. I pended the order. The patient stated that will do. Pended.

## 2023-02-17 ENCOUNTER — HOSPITAL ENCOUNTER (EMERGENCY)
Facility: HOSPITAL | Age: 33
Discharge: HOME | End: 2023-02-17
Payer: MEDICAID

## 2023-02-17 ENCOUNTER — APPOINTMENT (OUTPATIENT)
Dept: CT IMAGING | Facility: HOSPITAL | Age: 33
End: 2023-02-17
Attending: NURSE PRACTITIONER
Payer: MEDICAID

## 2023-02-17 VITALS
OXYGEN SATURATION: 100 % | WEIGHT: 130 LBS | TEMPERATURE: 98 F | RESPIRATION RATE: 16 BRPM | HEART RATE: 78 BPM | SYSTOLIC BLOOD PRESSURE: 105 MMHG | BODY MASS INDEX: 20.89 KG/M2 | DIASTOLIC BLOOD PRESSURE: 72 MMHG | HEIGHT: 66 IN

## 2023-02-17 DIAGNOSIS — K52.9 GASTROENTERITIS: Primary | ICD-10-CM

## 2023-02-17 LAB
ALBUMIN SERPL-MCNC: 4.2 G/DL (ref 3.4–5)
ALBUMIN/GLOB SERPL: 1.2 {RATIO} (ref 1–2)
ALP LIVER SERPL-CCNC: 94 U/L
ALT SERPL-CCNC: 18 U/L
ANION GAP SERPL CALC-SCNC: 7 MMOL/L (ref 0–18)
AST SERPL-CCNC: 14 U/L (ref 15–37)
B-HCG UR QL: NEGATIVE
B-HCG UR QL: NEGATIVE
BASOPHILS # BLD AUTO: 0.04 X10(3) UL (ref 0–0.2)
BASOPHILS NFR BLD AUTO: 0.3 %
BILIRUB SERPL-MCNC: 0.5 MG/DL (ref 0.1–2)
BILIRUB UR QL: NEGATIVE
BUN BLD-MCNC: 10 MG/DL (ref 7–18)
BUN/CREAT SERPL: 11.2 (ref 10–20)
C DIFF TOX B STL QL: NEGATIVE
CALCIUM BLD-MCNC: 8.9 MG/DL (ref 8.5–10.1)
CHLORIDE SERPL-SCNC: 106 MMOL/L (ref 98–112)
CO2 SERPL-SCNC: 29 MMOL/L (ref 21–32)
COLOR UR: YELLOW
CREAT BLD-MCNC: 0.89 MG/DL
DEPRECATED RDW RBC AUTO: 39.2 FL (ref 35.1–46.3)
EOSINOPHIL # BLD AUTO: 0.32 X10(3) UL (ref 0–0.7)
EOSINOPHIL NFR BLD AUTO: 2.5 %
ERYTHROCYTE [DISTWIDTH] IN BLOOD BY AUTOMATED COUNT: 11.8 % (ref 11–15)
GFR SERPLBLD BASED ON 1.73 SQ M-ARVRAT: 89 ML/MIN/1.73M2 (ref 60–?)
GLOBULIN PLAS-MCNC: 3.5 G/DL (ref 2.8–4.4)
GLUCOSE BLD-MCNC: 96 MG/DL (ref 70–99)
GLUCOSE UR-MCNC: NEGATIVE MG/DL
HCT VFR BLD AUTO: 48.1 %
HGB BLD-MCNC: 15.6 G/DL
HGB UR QL STRIP.AUTO: NEGATIVE
IMM GRANULOCYTES # BLD AUTO: 0.02 X10(3) UL (ref 0–1)
IMM GRANULOCYTES NFR BLD: 0.2 %
LEUKOCYTE ESTERASE UR QL STRIP.AUTO: NEGATIVE
LIPASE SERPL-CCNC: 147 U/L (ref 73–393)
LIPASE SERPL-CCNC: 34 U/L (ref 13–75)
LYMPHOCYTES # BLD AUTO: 0.67 X10(3) UL (ref 1–4)
LYMPHOCYTES NFR BLD AUTO: 5.2 %
MCH RBC QN AUTO: 29.4 PG (ref 26–34)
MCHC RBC AUTO-ENTMCNC: 32.4 G/DL (ref 31–37)
MCV RBC AUTO: 90.6 FL
MONOCYTES # BLD AUTO: 0.71 X10(3) UL (ref 0.1–1)
MONOCYTES NFR BLD AUTO: 5.6 %
NEUTROPHILS # BLD AUTO: 11.01 X10 (3) UL (ref 1.5–7.7)
NEUTROPHILS # BLD AUTO: 11.01 X10(3) UL (ref 1.5–7.7)
NEUTROPHILS NFR BLD AUTO: 86.2 %
NITRITE UR QL STRIP.AUTO: NEGATIVE
OSMOLALITY SERPL CALC.SUM OF ELEC: 293 MOSM/KG (ref 275–295)
PH UR: 5 [PH] (ref 5–8)
PLATELET # BLD AUTO: 349 10(3)UL (ref 150–450)
POTASSIUM SERPL-SCNC: 4.2 MMOL/L (ref 3.5–5.1)
PROT SERPL-MCNC: 7.7 G/DL (ref 6.4–8.2)
PROT UR-MCNC: 30 MG/DL
RBC # BLD AUTO: 5.31 X10(6)UL
SODIUM SERPL-SCNC: 142 MMOL/L (ref 136–145)
SP GR UR STRIP: 1.03 (ref 1–1.03)
UROBILINOGEN UR STRIP-ACNC: <2
VIT C UR-MCNC: 20 MG/DL
WBC # BLD AUTO: 12.8 X10(3) UL (ref 4–11)

## 2023-02-17 PROCEDURE — 87493 C DIFF AMPLIFIED PROBE: CPT | Performed by: NURSE PRACTITIONER

## 2023-02-17 PROCEDURE — 96361 HYDRATE IV INFUSION ADD-ON: CPT

## 2023-02-17 PROCEDURE — 80053 COMPREHEN METABOLIC PANEL: CPT

## 2023-02-17 PROCEDURE — 96372 THER/PROPH/DIAG INJ SC/IM: CPT

## 2023-02-17 PROCEDURE — 87046 STOOL CULTR AEROBIC BACT EA: CPT | Performed by: NURSE PRACTITIONER

## 2023-02-17 PROCEDURE — 81025 URINE PREGNANCY TEST: CPT

## 2023-02-17 PROCEDURE — 99285 EMERGENCY DEPT VISIT HI MDM: CPT

## 2023-02-17 PROCEDURE — 81001 URINALYSIS AUTO W/SCOPE: CPT

## 2023-02-17 PROCEDURE — 83690 ASSAY OF LIPASE: CPT

## 2023-02-17 PROCEDURE — S0028 INJECTION, FAMOTIDINE, 20 MG: HCPCS | Performed by: NURSE PRACTITIONER

## 2023-02-17 PROCEDURE — 74177 CT ABD & PELVIS W/CONTRAST: CPT | Performed by: NURSE PRACTITIONER

## 2023-02-17 PROCEDURE — 87045 FECES CULTURE AEROBIC BACT: CPT | Performed by: NURSE PRACTITIONER

## 2023-02-17 PROCEDURE — 87427 SHIGA-LIKE TOXIN AG IA: CPT | Performed by: NURSE PRACTITIONER

## 2023-02-17 PROCEDURE — 96376 TX/PRO/DX INJ SAME DRUG ADON: CPT

## 2023-02-17 PROCEDURE — 96374 THER/PROPH/DIAG INJ IV PUSH: CPT

## 2023-02-17 PROCEDURE — 96375 TX/PRO/DX INJ NEW DRUG ADDON: CPT

## 2023-02-17 PROCEDURE — 85025 COMPLETE CBC W/AUTO DIFF WBC: CPT

## 2023-02-17 RX ORDER — ONDANSETRON 4 MG/1
4 TABLET, ORALLY DISINTEGRATING ORAL EVERY 4 HOURS PRN
Qty: 10 TABLET | Refills: 0 | Status: SHIPPED | OUTPATIENT
Start: 2023-02-17 | End: 2023-02-24

## 2023-02-17 RX ORDER — FAMOTIDINE 10 MG/ML
20 INJECTION, SOLUTION INTRAVENOUS ONCE
Status: COMPLETED | OUTPATIENT
Start: 2023-02-17 | End: 2023-02-17

## 2023-02-17 RX ORDER — DICYCLOMINE HCL 20 MG
20 TABLET ORAL 4 TIMES DAILY PRN
Qty: 30 TABLET | Refills: 0 | Status: SHIPPED | OUTPATIENT
Start: 2023-02-17 | End: 2023-03-19

## 2023-02-17 RX ORDER — METOCLOPRAMIDE HYDROCHLORIDE 5 MG/ML
10 INJECTION INTRAMUSCULAR; INTRAVENOUS ONCE
Status: COMPLETED | OUTPATIENT
Start: 2023-02-17 | End: 2023-02-17

## 2023-02-17 RX ORDER — ONDANSETRON 2 MG/ML
4 INJECTION INTRAMUSCULAR; INTRAVENOUS ONCE
Status: COMPLETED | OUTPATIENT
Start: 2023-02-17 | End: 2023-02-17

## 2023-02-17 RX ORDER — DICYCLOMINE HYDROCHLORIDE 10 MG/ML
20 INJECTION INTRAMUSCULAR ONCE
Status: COMPLETED | OUTPATIENT
Start: 2023-02-17 | End: 2023-02-17

## 2023-02-18 NOTE — ED QUICK NOTES
Rounding Completed    Patient comfortably resting after receiving additional fluids and antiemetic medication. Bed is locked and in lowest position. Call light within reach.

## 2023-02-18 NOTE — DISCHARGE INSTRUCTIONS
Take the Zofran every 4-6 hours as needed for nausea and vomiting  Bentyl every 8 hours as needed for stomach cramping  He also take Imodium 2 to 3 times a day for diarrhea  Please follow-up with your primary care doctor this week  Also call and schedule an appointment with a gastroenterologist

## 2023-02-20 ENCOUNTER — TELEPHONE (OUTPATIENT)
Dept: FAMILY MEDICINE CLINIC | Facility: CLINIC | Age: 33
End: 2023-02-20

## 2023-02-20 NOTE — TELEPHONE ENCOUNTER
Patient called, verified Name and . She is requesting refill of Adderall but needs an appointment for further refills. She is unable to schedule the appointment as her insurance will not kick in until April. She was not even allowed to pay out of pocket. She tried scheduling with other providers her insurance [employer]) suggested however, appointment is way too far out that by that time, she will already be able to see Dr. Valentin Cantor. [employer] is not contracted by [facility]. Due to insurance issue, advised to schedule an appointment with the suggested providers her insurance approves. Patient will call back once appointment is secured and will address with Dr. Valentin Cantor if she will be willing to prescribe refill for March.

## 2023-03-06 RX ORDER — DEXTROAMPHETAMINE SACCHARATE, AMPHETAMINE ASPARTATE, DEXTROAMPHETAMINE SULFATE AND AMPHETAMINE SULFATE 7.5; 7.5; 7.5; 7.5 MG/1; MG/1; MG/1; MG/1
30 TABLET ORAL DAILY
Qty: 30 TABLET | Refills: 0 | Status: CANCELLED | OUTPATIENT
Start: 2023-03-06

## 2023-03-06 NOTE — TELEPHONE ENCOUNTER
The patient called the pharmacy and they do not have the Adderall  30 mg in stock. They do have Adderall 20 mg.  Per the pharmacist she is to ask the provider to order the 20 mg and take 1 1/2 tablets. Pended.

## 2023-03-07 RX ORDER — DEXTROAMPHETAMINE SACCHARATE, AMPHETAMINE ASPARTATE, DEXTROAMPHETAMINE SULFATE AND AMPHETAMINE SULFATE 5; 5; 5; 5 MG/1; MG/1; MG/1; MG/1
TABLET ORAL
Qty: 45 TABLET | Refills: 0 | Status: SHIPPED | OUTPATIENT
Start: 2023-03-07

## 2023-03-07 NOTE — TELEPHONE ENCOUNTER
Patient is calling again to ask Dr. Santiago Cho to please refill her amphetamine-dextroamphetamine (ADDERALL.  See notes below and pended script

## 2023-03-09 NOTE — TELEPHONE ENCOUNTER
Patient calling ( identified name and  ) [place] does not have any Adderall at all BUT [facility]  IR in [facility] ,     [facility]    Medication pended for your review / approval  Med doesnot have a protocol     Thank you

## 2023-03-10 RX ORDER — DEXTROAMPHETAMINE SACCHARATE, AMPHETAMINE ASPARTATE, DEXTROAMPHETAMINE SULFATE AND AMPHETAMINE SULFATE 7.5; 7.5; 7.5; 7.5 MG/1; MG/1; MG/1; MG/1
30 TABLET ORAL DAILY
Qty: 30 TABLET | Refills: 0 | OUTPATIENT
Start: 2023-03-10

## 2023-03-11 ENCOUNTER — TELEPHONE (OUTPATIENT)
Dept: FAMILY MEDICINE CLINIC | Facility: CLINIC | Age: 33
End: 2023-03-11

## 2023-03-11 RX ORDER — DEXTROAMPHETAMINE SACCHARATE, AMPHETAMINE ASPARTATE, DEXTROAMPHETAMINE SULFATE AND AMPHETAMINE SULFATE 7.5; 7.5; 7.5; 7.5 MG/1; MG/1; MG/1; MG/1
30 TABLET ORAL DAILY
Qty: 30 TABLET | Refills: 0 | Status: SHIPPED | OUTPATIENT
Start: 2023-03-11

## 2023-03-11 RX ORDER — DEXTROAMPHETAMINE SACCHARATE, AMPHETAMINE ASPARTATE, DEXTROAMPHETAMINE SULFATE AND AMPHETAMINE SULFATE 7.5; 7.5; 7.5; 7.5 MG/1; MG/1; MG/1; MG/1
30 TABLET ORAL DAILY
Qty: 30 TABLET | Refills: 0 | OUTPATIENT
Start: 2023-03-11

## 2023-03-11 NOTE — TELEPHONE ENCOUNTER
Pt states 3/7/23 Refill that was sent to [facility] are out of stock but [facility]derall 30 mg which in is my normal dosage\"    Rx pended

## 2023-03-11 NOTE — TELEPHONE ENCOUNTER
On call note: Was called on 4/11/23 by patient that she needs her usual adderall script sent to [facility] in [place] as they have it in stock. Chart reviewed and script was sent.

## 2023-03-13 NOTE — TELEPHONE ENCOUNTER
On call note: Was called on 3/11/23 by patient needs adderall 30mg her usual script sent to her pharmacy as in [address] now. ERx sent as requested. Further refills as per DR. BRITTON.

## 2023-03-13 NOTE — TELEPHONE ENCOUNTER
(Message Delivered)   [facility] :  ======================================================================    Message # [phone]         2023 01:49p   [KIRTP]  To:  From:  CONSOLE  Primary MD:  Phone#:  ----------------------------------------------------------------------  Sharita Ivory  [phone] PT NOÉ VASQUEZ 3- RE: EMERGENCY RX ISSUE PT OF DR Luciano Taveras Paged at number :  Margarita Reap: [phone] at :  WYQ- 13:49

## 2023-06-09 NOTE — TELEPHONE ENCOUNTER
Please review. Protocol failed or has no protocol. Requested Prescriptions   Pending Prescriptions Disp Refills    amphetamine-dextroamphetamine (ADDERALL) 30 MG Oral Tab 30 tablet 0     Sig: Take 1 tablet (30 mg total) by mouth daily.        There is no refill protocol information for this order          Recent Outpatient Visits              9 months ago Subacute vaginitis    Michael Bacon MD    Office Visit    1 year ago Bipolar disorder in full remission, most recent episode unspecified type [facility])    Michael Bacon MD    Office Visit    1 year ago Screen for STD (sexually transmitted disease)    Arlyn Dunn, [address], [address] [facility], [address] [place] [address], [facility]    Office Visit    1 year ago Amenorrhea    Arlyn Dunn, [address], [address] [facility], [address] [facility], [facility]    Office Visit    1 year ago Other depression    Michael Bacon MD    Office Visit

## 2023-06-10 RX ORDER — DEXTROAMPHETAMINE SACCHARATE, AMPHETAMINE ASPARTATE, DEXTROAMPHETAMINE SULFATE AND AMPHETAMINE SULFATE 7.5; 7.5; 7.5; 7.5 MG/1; MG/1; MG/1; MG/1
30 TABLET ORAL DAILY
Qty: 30 TABLET | Refills: 0 | Status: SHIPPED | OUTPATIENT
Start: 2023-06-10 | End: 2023-06-12

## 2023-06-12 ENCOUNTER — TELEPHONE (OUTPATIENT)
Dept: FAMILY MEDICINE CLINIC | Facility: CLINIC | Age: 33
End: 2023-06-12

## 2023-06-12 RX ORDER — DEXTROAMPHETAMINE SACCHARATE, AMPHETAMINE ASPARTATE, DEXTROAMPHETAMINE SULFATE AND AMPHETAMINE SULFATE 7.5; 7.5; 7.5; 7.5 MG/1; MG/1; MG/1; MG/1
30 TABLET ORAL DAILY
Qty: 30 TABLET | Refills: 0 | Status: SHIPPED | OUTPATIENT
Start: 2023-06-12

## 2023-06-12 NOTE — TELEPHONE ENCOUNTER
Requesting to send the Adderal 30 mg prescription to [facility] now, Lissa Cerrato is out of stock.

## 2023-06-12 NOTE — TELEPHONE ENCOUNTER
Message noted: Chart reviewed and may refill medication as requested. Prescription sent to listed pharmacy. Please notify patient.  Further refills as per Dr. Olga Merlin

## 2023-07-08 ENCOUNTER — TELEPHONE (OUTPATIENT)
Dept: FAMILY MEDICINE CLINIC | Facility: CLINIC | Age: 33
End: 2023-07-08

## 2023-07-09 NOTE — TELEPHONE ENCOUNTER
Please review. Protocol Failed or has No Protocol. Requested Prescriptions   Pending Prescriptions Disp Refills    amphetamine-dextroamphetamine (ADDERALL) 30 MG Oral Tab 30 tablet 0     Sig: Take 1 tablet (30 mg total) by mouth daily.        There is no refill protocol information for this order          Recent Outpatient Visits              10 months ago Subacute vaginitis    Mikaela Ott MD    Office Visit    1 year ago Bipolar disorder in full remission, most recent episode unspecified type [facility])    Mikaela Ott MD    Office Visit    1 year ago Screen for STD (sexually transmitted disease)    Harmony Gonzalez, [address], [address] [facility], Maureen Ott CNM    Office Visit    1 year ago Amenorrhea    Harmony Gonzalez, [address], [address] [facility], Maureen Ott CNM    Office Visit    1 year ago Other depression    Mikaela Ott MD    Office Visit

## 2023-07-12 RX ORDER — DEXTROAMPHETAMINE SACCHARATE, AMPHETAMINE ASPARTATE, DEXTROAMPHETAMINE SULFATE AND AMPHETAMINE SULFATE 7.5; 7.5; 7.5; 7.5 MG/1; MG/1; MG/1; MG/1
30 TABLET ORAL DAILY
Qty: 30 TABLET | Refills: 0 | Status: SHIPPED | OUTPATIENT
Start: 2023-07-12

## 2023-07-12 RX ORDER — DEXTROAMPHETAMINE SACCHARATE, AMPHETAMINE ASPARTATE, DEXTROAMPHETAMINE SULFATE AND AMPHETAMINE SULFATE 7.5; 7.5; 7.5; 7.5 MG/1; MG/1; MG/1; MG/1
30 TABLET ORAL DAILY
Qty: 30 TABLET | Refills: 0 | Status: CANCELLED | OUTPATIENT
Start: 2023-07-12

## 2023-07-12 RX ORDER — DEXTROAMPHETAMINE SACCHARATE, AMPHETAMINE ASPARTATE, DEXTROAMPHETAMINE SULFATE AND AMPHETAMINE SULFATE 7.5; 7.5; 7.5; 7.5 MG/1; MG/1; MG/1; MG/1
30 TABLET ORAL DAILY
Qty: 30 TABLET | Refills: 0 | Status: CANCELLED | OUTPATIENT
Start: 2023-07-12 | End: 2023-08-11

## 2023-07-12 RX ORDER — DEXTROAMPHETAMINE SACCHARATE, AMPHETAMINE ASPARTATE, DEXTROAMPHETAMINE SULFATE AND AMPHETAMINE SULFATE 7.5; 7.5; 7.5; 7.5 MG/1; MG/1; MG/1; MG/1
30 TABLET ORAL DAILY
Qty: 30 TABLET | Refills: 0 | Status: CANCELLED | OUTPATIENT
Start: 2023-08-12 | End: 2023-09-11

## 2023-07-12 RX ORDER — DEXTROAMPHETAMINE SACCHARATE, AMPHETAMINE ASPARTATE, DEXTROAMPHETAMINE SULFATE AND AMPHETAMINE SULFATE 7.5; 7.5; 7.5; 7.5 MG/1; MG/1; MG/1; MG/1
30 TABLET ORAL DAILY
Qty: 30 TABLET | Refills: 0 | Status: CANCELLED | OUTPATIENT
Start: 2023-09-12 | End: 2023-10-12

## 2023-07-12 NOTE — TELEPHONE ENCOUNTER
Spoke to patient (name and  of patient verified). She is calling again for an update stating pharmacy has limited quantity of medication. Dr. Rickey Yu schedule ended at 1:40 pm today, refill request not yet responded to. Routing to Dr. Noe Streeter for Dr. Mathew Rogers, patient is out of medication and pharmacy has limited quantity in-stock. Medication pended for review. Thank you.

## 2023-07-13 NOTE — TELEPHONE ENCOUNTER
PA Required      amphetamine-dextroamphetamine (ADDERALL) 30 MG Oral Tab, Take 1 tablet (30 mg total) by mouth daily. , Disp: 30 tablet, Rfl: 0

## 2023-09-12 DIAGNOSIS — F90.9 ATTENTION DEFICIT HYPERACTIVITY DISORDER (ADHD), UNSPECIFIED ADHD TYPE: ICD-10-CM

## 2023-09-13 RX ORDER — DEXTROAMPHETAMINE SACCHARATE, AMPHETAMINE ASPARTATE, DEXTROAMPHETAMINE SULFATE AND AMPHETAMINE SULFATE 2.5; 2.5; 2.5; 2.5 MG/1; MG/1; MG/1; MG/1
TABLET ORAL
Qty: 90 TABLET | Refills: 0 | Status: SHIPPED | OUTPATIENT
Start: 2023-09-13

## 2023-10-01 DIAGNOSIS — F90.9 ATTENTION DEFICIT HYPERACTIVITY DISORDER (ADHD), UNSPECIFIED ADHD TYPE: ICD-10-CM

## 2023-10-02 RX ORDER — DEXTROAMPHETAMINE SACCHARATE, AMPHETAMINE ASPARTATE, DEXTROAMPHETAMINE SULFATE AND AMPHETAMINE SULFATE 2.5; 2.5; 2.5; 2.5 MG/1; MG/1; MG/1; MG/1
TABLET ORAL
Qty: 90 TABLET | Refills: 0 | Status: SHIPPED | OUTPATIENT
Start: 2023-10-02

## 2023-10-02 NOTE — TELEPHONE ENCOUNTER
Please review. Protocol failed or has no protocol.     Requested Prescriptions   Pending Prescriptions Disp Refills    amphetamine-dextroamphetamine (ADDERALL) 10 MG Oral Tab 90 tablet 0     Sig: 3 tablets po every day       There is no refill protocol information for this order          Recent Outpatient Visits              1 month ago Attention deficit hyperactivity disorder (ADHD), unspecified ADHD type    Re Kenny MD    Office Visit    1 year ago Subacute vaginitis    Re Kenny MD    Office Visit    1 year ago Bipolar disorder in full remission, most recent episode unspecified type [facility])    Re Kenny MD    Office Visit    1 year ago Screen for STD (sexually transmitted disease)    Franny Mirza, [address], [address] [facility], Jennifer Osorio, [facility]    Office Visit    1 year ago Amenorrhea    Franny Mirza, [address], [address] [facility], Jennifer Osorio, [address]    Office Visit

## 2023-10-11 ENCOUNTER — TELEPHONE (OUTPATIENT)
Dept: FAMILY MEDICINE CLINIC | Facility: CLINIC | Age: 33
End: 2023-10-11

## 2023-10-11 NOTE — TELEPHONE ENCOUNTER
Pt called back, advised Dr hill, will call Pharmacy and relay message, advised pharmacist, verbalized understanding

## 2023-10-11 NOTE — TELEPHONE ENCOUNTER
Patient stated that she is leaving out of town today or tomorrow, but her pharmacy will not release the adderall until 10/13/2023. Patient wanted to know if doctor can allow an early release? Yossi Pollard 27 indicated that adderall was last filled on 9/15/2023 so could not release until 10/13/2023 unless doctor ok's it. Ok to release early? Please advise.

## 2023-11-07 DIAGNOSIS — F90.9 ATTENTION DEFICIT HYPERACTIVITY DISORDER (ADHD), UNSPECIFIED ADHD TYPE: ICD-10-CM

## 2023-11-08 NOTE — TELEPHONE ENCOUNTER
Please review; no protocol  Medication pended for your review and approval.    Requested Prescriptions   Pending Prescriptions Disp Refills    amphetamine-dextroamphetamine (ADDERALL) 10 MG Oral Tab 90 tablet 0     Sig: 3 tablets po every day       There is no refill protocol information for this order

## 2023-11-09 RX ORDER — DEXTROAMPHETAMINE SACCHARATE, AMPHETAMINE ASPARTATE, DEXTROAMPHETAMINE SULFATE AND AMPHETAMINE SULFATE 2.5; 2.5; 2.5; 2.5 MG/1; MG/1; MG/1; MG/1
TABLET ORAL
Qty: 90 TABLET | Refills: 0 | Status: SHIPPED | OUTPATIENT
Start: 2023-11-09

## 2023-11-09 NOTE — TELEPHONE ENCOUNTER
Dr. Marciano Jackson --- See pended Adderall    Patient worried she will miss out on [id number] [facility],[address] on [address]. Patient called office. Date of birth and full name both confirmed. Following up on refill request. see notes above.

## 2023-11-29 DIAGNOSIS — F90.9 ATTENTION DEFICIT HYPERACTIVITY DISORDER (ADHD), UNSPECIFIED ADHD TYPE: ICD-10-CM

## 2023-11-29 RX ORDER — DEXTROAMPHETAMINE SACCHARATE, AMPHETAMINE ASPARTATE, DEXTROAMPHETAMINE SULFATE AND AMPHETAMINE SULFATE 2.5; 2.5; 2.5; 2.5 MG/1; MG/1; MG/1; MG/1
TABLET ORAL
Qty: 90 TABLET | Refills: 0 | Status: SHIPPED | OUTPATIENT
Start: 2023-11-29

## 2023-11-29 NOTE — TELEPHONE ENCOUNTER
Please review. Protocol failed or has no protocol.     Requested Prescriptions   Pending Prescriptions Disp Refills    amphetamine-dextroamphetamine (ADDERALL) 10 MG Oral Tab 90 tablet 0     Sig: 3 tablets po every day       There is no refill protocol information for this order          Recent Outpatient Visits              3 months ago Attention deficit hyperactivity disorder (ADHD), unspecified ADHD type    Jovanna Paris MD    Office Visit    1 year ago Subacute vaginitis    Jovanna Paris MD    Office Visit    1 year ago Bipolar disorder in full remission, most recent episode unspecified type [facility])    Jovanna Paris MD    Office Visit    1 year ago Screen for STD (sexually transmitted disease)    [address], [address], [address] [facility], [place], [facility]    Office Visit    1 year ago Amenorrhea    [address], [address], [address] [facility], [place], [address] [facility]    Office Visit

## 2023-12-30 DIAGNOSIS — F90.9 ATTENTION DEFICIT HYPERACTIVITY DISORDER (ADHD), UNSPECIFIED ADHD TYPE: ICD-10-CM

## 2023-12-31 NOTE — TELEPHONE ENCOUNTER
Please review: Protocol failed/No protocol    Requested Prescriptions   Pending Prescriptions Disp Refills    amphetamine-dextroamphetamine (ADDERALL) 10 MG Oral Tab 90 tablet 0     Sig: 3 tablets po every day       There is no refill protocol information for this order          Recent Outpatient Visits              4 months ago Attention deficit hyperactivity disorder (ADHD), unspecified ADHD type    Elias Peña MD    Office Visit    1 year ago Subacute vaginitis    Elias Peña MD    Office Visit    1 year ago Bipolar disorder in full remission, most recent episode unspecified type [facility])    Elias Peña MD    Office Visit    1 year ago Screen for STD (sexually transmitted disease)    [address], [address], [address] [facility], [facility]    Office Visit    2 years ago Amenorrhea    [address], [address], [address] [facility], [address]     Office Visit

## 2024-01-01 RX ORDER — DEXTROAMPHETAMINE SACCHARATE, AMPHETAMINE ASPARTATE, DEXTROAMPHETAMINE SULFATE AND AMPHETAMINE SULFATE 2.5; 2.5; 2.5; 2.5 MG/1; MG/1; MG/1; MG/1
TABLET ORAL
Qty: 90 TABLET | Refills: 0 | Status: SHIPPED | OUTPATIENT
Start: 2024-01-01

## 2024-01-16 ENCOUNTER — NURSE TRIAGE (OUTPATIENT)
Dept: FAMILY MEDICINE CLINIC | Facility: CLINIC | Age: 34
End: 2024-01-16

## 2024-01-17 ENCOUNTER — TELEMEDICINE (OUTPATIENT)
Dept: FAMILY MEDICINE CLINIC | Facility: CLINIC | Age: 34
End: 2024-01-17

## 2024-01-17 DIAGNOSIS — R51.9 INTRACTABLE EPISODIC HEADACHE, UNSPECIFIED HEADACHE TYPE: ICD-10-CM

## 2024-01-17 DIAGNOSIS — R63.0 ANOREXIA: ICD-10-CM

## 2024-01-17 DIAGNOSIS — R11.2 NAUSEA AND VOMITING, UNSPECIFIED VOMITING TYPE: ICD-10-CM

## 2024-01-17 DIAGNOSIS — Z00.00 ANNUAL PHYSICAL EXAM: Primary | ICD-10-CM

## 2024-01-17 PROCEDURE — 99214 OFFICE O/P EST MOD 30 MIN: CPT | Performed by: FAMILY MEDICINE

## 2024-01-17 RX ORDER — MIRTAZAPINE 7.5 MG/1
TABLET, FILM COATED ORAL
Qty: 90 TABLET | Refills: 0 | Status: SHIPPED | OUTPATIENT
Start: 2024-01-17

## 2024-01-17 NOTE — PROGRESS NOTES
Subjective:   Patient ID: Jazzy Lucero is a 33 year old female.  Telehealth outside of [facility]  Telehealth Verbal Consent   I conducted a telehealth visit with Jazzy Lucero today, 01/17/24, which was completed using two-way, real-time interactive audio and video communication. This has been done in good olaf to provide continuity of care in the best interest of the provider-patient relationship, due to the COVID -19 public health crisis/national emergency where restrictions of face-to-face office visits are ongoing. Every conscious effort was taken to allow for sufficient and adequate time to complete the visit.  The patient was made aware of the limitations of the telehealth visit, including treatment limitations as no physical exam could be performed.  The patient was advised to call 911 or to go to the ER in case there was an emergency.  The patient was also advised of the potential privacy & security concerns related to the telehealth platform.   The patient was made aware of where to find [facility]'s notice of privacy practices, telehealth consent form and other related consent forms and documents.  which are located on the [facility] website. The patient verbally agreed to telehealth consent form, related consents and the risks discussed.    Lastly, the patient confirmed that they were in [place].   Included in this visit, time may have been spent reviewing labs, medications, radiology tests and decision making. Appropriate medical decision-making and tests are ordered as detailed in the plan of care above.  Coding/billing information is submitted for this visit based on complexity of care and/or time spent for the visit.  This is video visit      HPI  Since Mid December vomiting   No apettite   Changed diet a lot   Has pain when eats   Also feeling fatigued for last few years     First few days January started with vomiting   Then has even shortness of breath   Also vision is getting worse   Vomiting every  other day.   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   Also has constant headache   And that often accompanies vomiting   Hd a cousing with a brain tumor   Apettite is poor  History/Other:   Review of Systems    Constitutional: Negative.  Negative for activity change, appetite change, diaphoresis and fatigue.     Respiratory: Negative.  Negative for apnea, cough, chest tightness and shortness of breath.    Cardiovascular: Negative.  Negative for chest pain, palpitations and leg swelling.   Gastrointestinal: see hpi   Skin: Negative.           Psychiatric/Behavioral: see hpi  Current Outpatient Medications   Medication Sig Dispense Refill    amphetamine-dextroamphetamine (ADDERALL) 10 MG Oral Tab 3 tablets po every day 90 tablet 0    Norgestim-Eth Estrad Triphasic (ORTHO TRI-CYCLEN LO) 0.18/0.215/0.25 MG-25 MCG Oral Tab Take 1 tablet by mouth daily. (Patient not taking: Reported on 8/16/2023) 84 tablet 1     Allergies:  Allergies   Allergen Reactions    Wellbutrin [Bupropion] RASH       Objective:   Physical Exam  Pt is breathing comfortable over the phone and speaking in full sentences without shortness of breath    Assessment & Plan:     ICD-10-CM    1. Annual physical exam  Z00.00 Comp Metabolic Panel (14)     Assay, Thyroid Stim Hormone     CBC With Differential With Platelet     Lipid Panel     Vitamin B12     Vitamin D [E]      2. Nausea and vomiting, unspecified vomiting type  R11.2 CT BRAIN OR HEAD(CONTRAST ONLY) (CPT=70460)   Will order ct scan brain blood test    3. Intractable episodic headache, unspecified headache type  R51.9 CT BRAIN OR HEAD(CONTRAST ONLY) (CPT=70460)      4. Anorexia  R63.0    Start remeron   F/u in 1 month         No orders of the defined types were placed in this encounter.      Meds This Visit:  Requested Prescriptions      No prescriptions requested or ordered in this encounter       Imaging &  Referrals:  None

## 2024-01-17 NOTE — TELEPHONE ENCOUNTER
Received incoming page from patient who reports that she has been having symptoms for 18 months.  Have been worsening since 18 months.  With heavy cough, fatigue, loss of appetite, headaches, dizziness, lightheadedness.  Now with intermittent vomiting daily since and intermittent loss of vision and pain in eyes.  Informed patient that she should be seen for an appt and assessed since symptoms have been worsening.  She stated that she has an appt on 2/1 and would like to be seen sooner.      NICOLE Jackson.      Triage, please call and triage patient.  Thanks.

## 2024-01-17 NOTE — TELEPHONE ENCOUNTER
Spoke with patient briefly stating she was at work and could not talk at the moment. Per patient, will call back and speak to a nurse.

## 2024-01-17 NOTE — TELEPHONE ENCOUNTER
Action Requested: Summary for Provider     []  Critical Lab, Recommendations Needed  [] Need Additional Advice  [x]   FYI    []   Need Orders  [] Need Medications Sent to Pharmacy  []  Other     SUMMARY: Patient scheduled for a video visit today with .    Patient states since 2021 she has been having \"episodes\" that last few seconds then go away, Patient states she will get short of breath,blurred vision, dizziness and vomiting and spontaneous bruising. This has been happening more often. Patient denies symptoms at this time, speaking in complete sentences at time of call       Patient asking to be seen only by , only video appointment available. Patient at work at this time and agrees to have video visit to discuss symptoms.    Reason for call: Vomiting and Dizziness  Onset: over 2 years

## 2024-02-04 DIAGNOSIS — F90.9 ATTENTION DEFICIT HYPERACTIVITY DISORDER (ADHD), UNSPECIFIED ADHD TYPE: ICD-10-CM

## 2024-02-05 ENCOUNTER — TELEPHONE (OUTPATIENT)
Dept: FAMILY MEDICINE CLINIC | Facility: CLINIC | Age: 34
End: 2024-02-05

## 2024-02-05 RX ORDER — DEXTROAMPHETAMINE SACCHARATE, AMPHETAMINE ASPARTATE, DEXTROAMPHETAMINE SULFATE AND AMPHETAMINE SULFATE 2.5; 2.5; 2.5; 2.5 MG/1; MG/1; MG/1; MG/1
TABLET ORAL
Qty: 90 TABLET | Refills: 0 | Status: SHIPPED | OUTPATIENT
Start: 2024-02-05

## 2024-02-05 NOTE — TELEPHONE ENCOUNTER
Patient checking in on status of medication refill.   Advised provider was not in the office today.   Per patient, requested message be sent to a covering physician. Patient states she is currently out of the medication and has to work tomorrow.  Sending message to Dr. Gomez and Patty Buchanan NP (podmate since provider is out of the office)

## 2024-02-06 NOTE — TELEPHONE ENCOUNTER
Received page from patient requesting urgent refill on Adderall. Having anxiety about not getting her medication. Reports leaving multiple messages with her PCP and got no answers today. Documented last Rx given/sent on 01/01/2024. She was rude, insistent on refill and not wanting to call tomorrow to speak with her PCP. I tied to explained that this medication needs to be obtained every time from her PCP.

## 2024-02-13 ENCOUNTER — PATIENT MESSAGE (OUTPATIENT)
Dept: CASE MANAGEMENT | Age: 34
End: 2024-02-13

## 2024-02-15 NOTE — TELEPHONE ENCOUNTER
CT BRAIN OR HEAD(CONTRAST ONLY) (FSN=[id number])     [facility], the following request is authorized    Authorization: [id number]  Valid: 02/13/2024-08/13/2024     Notified via [employer]   
no

## 2024-03-01 DIAGNOSIS — F90.9 ATTENTION DEFICIT HYPERACTIVITY DISORDER (ADHD), UNSPECIFIED ADHD TYPE: ICD-10-CM

## 2024-03-04 RX ORDER — DEXTROAMPHETAMINE SACCHARATE, AMPHETAMINE ASPARTATE, DEXTROAMPHETAMINE SULFATE AND AMPHETAMINE SULFATE 2.5; 2.5; 2.5; 2.5 MG/1; MG/1; MG/1; MG/1
30 TABLET ORAL DAILY
Qty: 90 TABLET | Refills: 0 | Status: SHIPPED | OUTPATIENT
Start: 2024-03-04

## 2024-03-04 NOTE — TELEPHONE ENCOUNTER
[facility] Progress Note for 2024 11:56 AM    Patient Name:CALI RAYO   Account #:[id number]  MRN:[id number]  Gender:Female  YOB: 2022 7:[birth year] AM    Demographics    Date:2024 11:56:45 AM  Age:14 days  Post Conceptional Age:26 weeks 4 days  Weight:0.750kg    Date/Time of Admission:2024 7:39:00 AM  Birth Date/Time:2022 7:39:00 AM  Gestational Age at Birth:24 weeks 4 days    Primary Care Physician:Derick Hernández MD    Current Medications:Duration:  1. caffeine citrate 7.7 mg IV q 24h (60 mg/3 mL (20 mg/mL) solution(IV))  (Until   Discontinued)  (10 mg/kg/dose) Day 15  2. indomethacin sodium 0.19 mg IV q 12h (0.1 mg/1 mL recon soln(IV))  (2 Doses)    (0.25 mg/kg/dose) Day 2    PHYSICAL EXAMINATION    Respiratory StatusNIV SIMV JENNIFER Cannula    Growth Parameter(s)Weight: 0.750 kg   Length: 35.4 cm   HC: 22.5 cm    General:Bed/Temperature Support (stable in incubator); Respiratory Support   (NCPAP - JENNIFER cannula, no upward or septal pressure) mild erythema to septum;  Head:normocephalic; fontanelle (normal, flat); sutures (mobile);  Ears:ears (normal);  Nose:nares (normal);  Throat:mouth (normal); tongue (normal); OG tube (yes);  Neck:general appearance (normal); range of motion (normal);  Respiratory:respiratory effort (abnormal, retractions); respiratory distress   (yes) mild; breath sounds (bilateral, crackles (rales)); retractions exaggerated   by pectus excavatum;  Cardiac:precordium (normal); rhythm (sinus rhythm); murmur (no); perfusion   (normal); pulses (normal);  Abdomen:abdomen (soft, nontender, round, bowel sounds present, organomegaly   absent); abdomen with diastasis recti;  Genitourinary:genitalia (, female);  Anus and Rectum:anus (patent);  Spine:spine appearance (normal);  Extremity:deformity (no); range of motion (normal);  Skin:skin appearance () - scattered abrasions to extremities, abdomen,   umbilicus that are dried and peeling;  Please review; protocol failed/No Protocol    LOV: telemedicine 01/17/2024  Sent Luxul Wirelesst message to patient to schedule follow up per office note on 01/17/2024    Fill dates: 12/07/2023, 01/04/2024, 02/05/2024-pended for 03/06/2024-based on fill dates and day supply.   Requested Prescriptions   Pending Prescriptions Disp Refills    amphetamine-dextroamphetamine (ADDERALL) 10 MG Oral Tab 90 tablet 0     Sig: 3 tablets po every day       Controlled Substance Medication Failed - 3/1/2024  7:24 PM        Failed - This medication is a controlled substance - forward to provider to refill             Recent Outpatient Visits              1 month ago [facility]    [facility]    6 months ago Attention deficit hyperactivity disorder (ADHD), unspecified ADHD type    [facility] Laura Scales Tanja, MD    Office Visit    1 year ago Subacute vaginitis    [facility] Laura Scales Tanja, MD    Office Visit    1 year ago Bipolar disorder in full remission, most recent episode unspecified type (HCC)    [facility] Laura Scales Tanja, MD    Office Visit    2 years ago Screen for STD (sexually transmitted disease)    [facility]    Office Visit               jaundice (minimal); bruising (mild, torso,   arm, leg);  Neuro:mental status (responsive); muscle tone (normal);  Vascular Access:PICC (right, Basilic Vein, no evidence of vascular compromise);    LABS  2024 8:25:00 AM   HCT 36; Sodium 129; Potassium 5.7; Glucose 121; Calcium -  Ionized 1.41;   Specimen Source DON CAP; pH 7.181; pCO2 46.7; pO2 38; HCO3 17.5; BE -11; SPO2   58; Ventilator Support Inf Vent; FiO2 21; Mode SIMV; PIP 20; PEEP 4; Pressure   Support 10; Rate 20; Specimen Source Other; Rogelio's Test N/A; Bili - Total 3.8;   Bili - Direct 0.4  2024 4:06:00 PM   HCT 37; Sodium 133; Potassium 5.4; Glucose 96; Calcium -  Ionized 1.31;   Specimen Source DON CAP; pH 7.268; pCO2 44.0; pO2 37; HCO3 20.1; BE -7; SPO2 62;   Ventilator Support Inf Vent; FiO2 25; Mode SIMV; PIP 20; PEEP 4; Pressure   Support 10; Rate 20; Specimen Source Other; Rogelio's Test N/A  2024 12:17:00 AM   HCT 33; Sodium 135; Potassium 4.6; Glucose 128; Calcium -  Ionized 1.36;   Specimen Source DON CAP; pH 7.315; pCO2 40.9; pO2 36; HCO3 20.8; BE -5; SPO2 64;   Ventilator Support CPAP/BiPAP; FiO2 30; Mode SIMV; PIP 20; PEEP 4; Pressure   Support 10; Rate 20; Specimen Source Other; Rogelio's Test N/A  2024 2:57:00 AM   Platelet Count 210; MPV SEE COMMENT  2024 8:15:00 AM   Creatinine 0.9; Bili - Total 4.6; Bili - Direct 0.4  2024 8:16:00 AM   HCT 36; Sodium 139; Potassium 4.4; Glucose 112; Calcium -  Ionized 1.40;   Specimen Source DON CAP; pH 7.308; pCO2 43.8; pO2 38; HCO3 22.0; BE -4; SPO2 66;   Ventilator Support Inf Vent; FiO2 32; Mode SIMV; PIP 20; PEEP 4; Pressure   Support 10; Rate 20; Specimen Source Other; Rogelio's Test N/A    NUTRITION    Prior Day's Intake  Actual Parenteral:  Lipid - PICC:   IL20 3 g/kg/day    TPN - PICC:   Dex 7 g/dl/day; Troph10 3.5 g/kg/day; NaCl 2 mEq/kg/day; NaAc 1.5   mEq/kg/day; NaPO4 2 mm/kg/day; KCl 1 mEq/kg/day; KAc 1 mEq/kg/day; MgSO4 0.2   mEq/kg/day; CaGluc 250 mg/kg/day; Hep 0.5  unit/1 ml; MVI 1.5 ml/day; Multrys 0.3   ml/kg/day; Zn 0.15 mg/kg/day; L-Carn 10 mg/kg/day; L-Cys 140 mg/kg/day    Total Actual Parenteral:93 rhe627 ml/kg/day66 foster/kg/day    Actual Enteral:  Breast Milk: 1.8 ml/hr continuous feeds per OG    Total Actual Enteral:26 mls34 ml/kg/day23 foster/kg/day    Projected Intake  Projected Parenteral:  TPN - PICC:   Dex 7 g/dl/day; Troph10 3.5 g/kg/day; NaCl 1 mEq/kg/day; NaAc 1.5   mEq/kg/day; NaPO4 2 mm/kg/day; KCl 1 mEq/kg/day; KAc 1 mEq/kg/day; MgSO4 0.2   mEq/kg/day; CaGluc 250 mg/kg/day; Hep 0.5 unit/1 ml; MVI 1.5 ml/day; Multrys 0.3   ml/kg/day; Zn 0.15 mg/kg/day; L-Carn 10 mg/kg/day; L-Cys 140 mg/kg/day    Lipid - PICC:   IL20 3 g/kg/day    Total Projected Parenteral:112 hec211 ml/kg/day75 foster/kg/day    Output:  Urine (ml):63Urine (ml/kg/hr):3.41  Stool (#):2Stool (g):    DIAGNOSES  1. Extremely low birth weight , 750-999 grams (P07.03)  Onset: 2024    2. Extreme immaturity of , gestational age 24 completed weeks (P07.23)  Onset: 2024  Comments:  Gestational age based on Aleman examination or EDC.    Plans:  K[facility]aroo Care per protocol   obtain car seat screen prior to discharge     3. Respiratory distress syndrome of  (P22.0)  Onset: 2024  Comments:  Infant with respiratory distress at birth.  Intubated in the delivery room.    Surfactant administered.  CXR consistent with Respiratory Distress Syndrome.   Extubated at 2 hours of age to NIV. Oxygen requirements increasing   <TILDEPLACEHOLDER> 40% 3/1 am, intubated and second dose Curosurf given with   good response. Extubated to NIV 3/1 pm.  Currently requiring 30-32% FiO2.   Plans:   follow with pulse oximetry and blood gases as indicated   NIPPV    wean as tolerated   CBGs q 12 hours    4. Other apnea of  (P28.49)  Onset: 2024  Medications:  1.caffeine citrate 7.7 mg IV q 24h (60 mg/3 mL (20 mg/mL) solution(IV))  (Until   Discontinued)  (10 mg/kg/dose) Weight: 0.77 kg Start  Time: 2024 08:40   started on 2024  Comments:  Infant at risk for apnea of prematurity.  Caffeine begun to improve respiratory   drive. 3 episodes requiring stimulation over previous 24 hours.   Plans:   adjust caffeine dose for weight weekly    caffeine    discontinue caffeine when infant off of positive pressure and episode free for   7 days (< 30 weeks gestation)     5.  jaundice associated with  delivery (P59.0)  Onset: 2024  Procedures:  1.Phototherapy (Single) on 2024  Comments:  At risk for jaundice secondary to prematurity.   Infant's Blood Type:  O   Infant's Rh: POS   Infant Direct Kae:  NEG   Infant's Indirect Kae: NEG Infant has required multiple courses of   phototherapy, last 3/7-3/9.  Bili stable @ 3.8 and remains below light level.   3/13, serum bilirubin with rebound to 4.6, just below threshold; phototherapy   resumed.   Plans:   follow bilirubin level in AM  single phototherapy (spot)     6. Anemia of prematurity (P61.2)  Onset: 2024  Comments:  Initial Hct 40%.   PRBC transfusion on 24.  3/13 Hct 36%.   Plans:  transfuse as needed to maintain HCT 30-40%   follow HCT with blood gases    7. Other transitory  disorders of thyroid function, not elsewhere   classified (P72.2)  Onset: 2024  Comments:  Inconclusive thyroid studies on NBS.  3/9 TSH  Free T4 0.55, low and TSH 4.44,   normal.   3/11 TSH normal-5.42, Free T4 low -0.66.  Plans:   repeat TSH and Free T4 in 1 week  if free T4 remains low on Mon 3/18, consider endocrinology consult    8. Hyponatremia of  (P74.22)  Onset: 2024  Comments:  Sodium decreased to 129 3/12. 3/13 Improved to 139 with supplementation.   continue supplementation in TPN, adjust as indicated  follow electrolytes    9. Slow feeding of  (P92.2)  Onset: 2024  Comments:  Infant requiring gavage feedings due to immaturity.    Plans:   assess nipple readiness at 34 weeks per Cue-Based Feeding  policy     10. Other specified disturbances of temperature regulation of  (P81.8)  Onset: 2024  Comments:  Admitted to humidified isolette.  Plans:   monitor temperature in isolette, wean to open crib when indicated (ambient   temperature < 28 degrees, infant with good weight gain)   resume weaning of humidity     11. Nutritional Support ()  Onset: 2024  Comments:  Feeding choice: breast.  NPO at time of admission.   Mother consented to   Donor breast milk.  -3/3 Trophic feeds. NPO 3/6 am due to large bilious   residual and bilious emesis. No further bilious emesis. Mild gaseous distention   noted on KUB.  Feeds resumed 3/8, well tolerated, spontaneous stools. Back to   birth weight at 12 days of life. Growth velocity 21.2 gm/kg/d for week ending   3/11. 3/12 Made NPO while being treated with Indomethacin for PDA.   Plans:  NPO   follow electrolytes in AM    12. Vascular Access ()  Onset: 2024  Procedures:  1.Peripherally Inserted Central Catheter (PICC) - Basilic Vein (Right) -   Percutaneous on 2024  Comments:  UAC and UVC placed at time of admission to NICU.  Catheter position verified by   xray 3/3, UVC and UAC at T9.  PICC discussed with mother on . PICC placed   3/5, CXR confirms placement at T2.  Plans:  maintain PICC until central venous access not required    obtain xray to verify PICC placement weekly (next 3/15)    13. Patent ductus arteriosus (Q25.0)  Onset: 2024  Medications:  1.indomethacin sodium 0.19 mg IV q 12h (0.1 mg/1 mL recon soln(IV))  (2 Doses)    (0.25 mg/kg/dose) Weight: 0.77 kg started on 2024  Comments:  Infant at risk for PDA secondary to gestational age. 3/4 Echo shows large   PDA-left to right flow.  3/4-3/5 Indocin course completed.  ECHO 3/6 continues   with large PDA however infant is stable on low oxygen on NIV and infant   currently NPO due to abdominal distention and bilious emesis. 3/8 PDA small to   moderate, no treatment  recommended. 3/12 Echo with large PDA and PFO both left   to right flow; indocin course repeated. Platelet count stable. 3/13 Cr 0.9, UOP   stable.   repeat ECHO 3/14  repeat indomethacin course    14. Patent foramen ovale (Q21.12)  Onset: 2024  Comments:  Echo showed small secundum ASD vs PFO, left to right flow. 3/8 echo with PFO,   left to right flow, consistent with transitional anatomy and should resolve over   time.  follow with cardiology    15. Encounter for screening for nutritional disorder (Z13.21)  Onset: 2024  Comments:  At risk for Osteopenia of Prematurity secondary to gestational age. 3/4 Alkaline   phosphatase 354, Ca+, Phosphorous and Mg normal. 3/11 Alk Phos 281, Ca+, Mg,   and Phos normal.  Plans:   Discontinue weekly osteopenia panel after 1 month of age if alkaline   phosphatase < 500 U/L    Follow osteopenia panel weekly for first month of life    Supplement with Vitamin D and Poly-Vi-Sol with Iron per protocol when enteral   feedings > 120 mg/kg/day     16. Encounter for screening for other nervous system disorders (Z13.858)  Onset: 2024  Comments:  At risk for intraventricular hemorrhage secondary to prematurity. No evidence of   IVH on ultrasound on day of life 10.  Possible prominence of temporal horn of   left lateral ventricle.  Plans:  brain MRI prior to discharge as birthweight < 1 kg   repeat cranial ultrasound at 7 weeks of age    17. Encounter for examination of eyes and vision without abnormal findings   (Z01.00)  Onset: 2024  Comments:  At risk for Retinopathy of Prematurity secondary to gestational age. 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  Plans:   obtain initial ophthalmologic examination at 31 postmenstrual weeks (7 weeks   chronologic age)   erythromycin when eyes open    18. Encounter for screening for other metabolic disorders -  Metabolic   Screening (Z13.228)  Onset: 2024  Comments:   metabolic screening indicated. NBS obtained early , at  6 hours of   life, due to blood transfusion - Amino acid profile presumptive positive and   congenital hypothyroidism inconclusive. MPS1 and POMPE pending. Otherwise   normal, however obtained prior to 24 hours of age, rescreen recommended.   Plans:   follow  screen sent    [place] Screen to be repeated at 28 days of life or prior to discharge if   birthweight < 2 kg OR NICU stay > 14 days   repeat  screen 90 days after last transfusion   repeat recommended no later than 3rd week of life and when off TPN    19. Encounter for screening for other developmental delays (Z13.49)  Onset: 2024  Comments:  Infant at risk for long term neurologic sequelae secondary to low birth weight   and prematurity.  Plans:   followup in Neurodevelopmental Clinic at 4 months corrected age     20. Encounter for immunization (Z23)  Onset: 2024  Comments:  Recommended immunizations prior to discharge as indicated.   Plans:   administer Beyfortus (nirsevimab-alip) 48 hours prior to discharge for infants   born during or entering RSV season    complete immunizations on schedule    Maternal HBsAg Negative and birthweight < 2000 grams, administer Hepatitis B   vaccine at 1 month of age     21. Single liveborn infant, delivered by  (Z38.01)  Onset: 2024  Comments:  Vitamin K given . Erythromycin held due to fused eyes.   Plans:   Erythromycin eye prophylaxis when eyes open    22. Encounter for examination of ears and hearing without abnormal findings   (Z01.10)  Onset: 2024  Comments:  [place] hearing screening indicated.  Plans:   obtain a hearing screen before discharge     23. Acute metabolic acidosis (E87.21)  Onset: 2024  Comments:  Has received several doses of sodium bicarbonate since admission. 3/11 HCO3   20.7/BD -7.0. 3/13 Acidosis improving; HCO3 22/ BD -4.   administer sodium bicarbonate if clinically indicated  continue acetate in TPN   follow blood gases    24.  Hyperlipidemia, unspecified (E78.5)  Onset: 2024  Comments:  3/3 Triglyceride level 102. 3/11 level 129.      Plans:   continue intralipids at 3gm/kg/d   follow triglyceride level next on Monday 25. Restlessness and agitation (R45.1)  Onset: 2024  Comments:  Administer sedation/analgesia as clinically indicated.   administer sedation if indicated - not currently ordered    26. Abnormal results of liver function studies (R94.5)  Onset: 2024  Comments:  Total protein 4.5, Albumin 2.7, ALT < 5, AST 14. GGT 50. 3/11 Total protein 5.1,   Alb 2.7(minimally low), ALT low < 5, AST 22, GGT 37.  follow liver enzymes weekly, next on Monday 27. Abdominal distension (gaseous) (R14.0)  Onset: 2024  Comments:  Infant with increasing abdominal distention with visible bowel loops and bilious   emesis 3/6 am, KUB with moderate gaseous distension. NPO 3/6.  Gaseous   distention continues on KUB, no pneumatosis or free air. Abdomen remains round   but soft and good bowel sounds, diastasis recti noted. Small feeds resumed 3/8,   but made NPO on 3/12 for Indocin treatment.   follow KUB as needed    28. Diaper dermatitis (L22)  Onset: 2024  Comments:  At risk due to gestational age.  Plans:   continue zinc oxide PRN     29. Disorder of the skin and subcutaneous tissue, unspecified (L98.9)  Onset: 2024  Comments:  Abrasions noted to bilateral antecubital area.  Bacitracin applied.  3/4 sites   healing well. Skin is now dry and peeling, no open areas noted.  apply non-adhering silicone dressing (Adaptive Touch) to open abrasions as   needed    CARE PLAN  1. Parental Interaction  Onset: 2024  Comments  Mother updated via phone regarding infant's status and plan of care. Discussed   continuing current respiratory support, continuing Indocin course, and following   lab work q 12 hours. Discussed resuming phototherapy and following with   cardiology tomorrow.   Plans   continue family updates     2.  Discharge Plans  Onset: 2024  Comments  The infant will be ready for discharge upon demonstration for at least 48 hours   each of the following: (1) physiologically mature and stable cardiorespiratory   function (2) sustained pattern of weight gain (3) maintenance of normal   thermoregulation in an open crib and (4) competent feedings without   cardiorespiratory compromise.    Rounds made/plan of care discussed with Facundo Mata MD  .    Preparer:JASON: FELIZ Luciano, SARAH 2024 11:56 AM      Attending: JASON: Facundo Mata MD 2024 5:50 PM

## 2024-03-04 NOTE — TELEPHONE ENCOUNTER
To pharmacy: Needs to make an apt for further refills       Call center please call and schedule an appointment.  Thank You.  Mychart message sent to the patient.

## 2024-04-01 DIAGNOSIS — F90.9 ATTENTION DEFICIT HYPERACTIVITY DISORDER (ADHD), UNSPECIFIED ADHD TYPE: ICD-10-CM

## 2024-04-04 RX ORDER — DEXTROAMPHETAMINE SACCHARATE, AMPHETAMINE ASPARTATE, DEXTROAMPHETAMINE SULFATE AND AMPHETAMINE SULFATE 2.5; 2.5; 2.5; 2.5 MG/1; MG/1; MG/1; MG/1
30 TABLET ORAL DAILY
Qty: 90 TABLET | Refills: 0 | Status: SHIPPED | OUTPATIENT
Start: 2024-04-04

## 2024-04-04 NOTE — TELEPHONE ENCOUNTER
Please review; protocol failed/ has no protocol    Requested Prescriptions   Pending Prescriptions Disp Refills    amphetamine-dextroamphetamine (ADDERALL) 10 MG Oral Tab 90 tablet 0     Sig: Take 3 tablets (30 mg total) by mouth daily.       Controlled Substance Medication Failed - 4/1/2024 12:27 PM        Failed - This medication is a controlled substance - forward to provider to refill           Recent Outpatient Visits              2 months ago Annual physical exam    [facility]    [facility]    7 months ago Attention deficit hyperactivity disorder (ADHD), unspecified ADHD type    [facility]    Office Visit    1 year ago Subacute vaginitis    [facility]    Office Visit    1 year ago Bipolar disorder in full remission, most recent episode unspecified type (HCC)    [facility] Laura Scales Tanja, MD    Office Visit    2 years ago Screen for STD (sexually transmitted disease)    [facility]    Office Visit

## 2024-04-22 ENCOUNTER — NURSE TRIAGE (OUTPATIENT)
Dept: FAMILY MEDICINE CLINIC | Facility: CLINIC | Age: 34
End: 2024-04-22

## 2024-04-22 NOTE — TELEPHONE ENCOUNTER
PT booked via [employer]         Message    Appointment For: Jazzy Lucero ([id number])   Visit Type: MYCHART FOLLOW UP (3209)      5/17/2024    8:10 AM  20 mins.  Etelvina Gomez              [facility]      Patient Comments:   1,Persisting symptoms (chronic fatigue/GI issues/vertigo etc). 2,chronic cough/difficulty breathing

## 2024-04-22 NOTE — TELEPHONE ENCOUNTER
Please reply to pool: EM RN TRIAGE  Action Requested: Summary for Provider     []  Critical Lab, Recommendations Needed  [] Need Additional Advice  [x]   FYI    []   Need Orders  [] Need Medications Sent to Pharmacy  []  Other     SUMMARY: Contacted patient about her appointment.  States she has experienced coughing, rattling and wheezing over the last month.  Gets worse with activity.  Denies fever, body aches or chills.  Denies chest pain.  Longstanding history of fatigue and dizziness.  Acute visit booked to assess respiratory status.      Reason for call: Acute  Onset: Data Unavailable                       Reason for Disposition   MODERATE longstanding difficulty breathing (e.g., speaks in phrases, SOB even at rest, pulse 100-120) and SAME as normal    Protocols used: Breathing Difficulty-A-OH

## 2024-04-25 ENCOUNTER — LAB ENCOUNTER (OUTPATIENT)
Dept: LAB | Age: 34
End: 2024-04-25
Attending: FAMILY MEDICINE
Payer: MEDICAID

## 2024-04-25 DIAGNOSIS — Z00.00 ANNUAL PHYSICAL EXAM: ICD-10-CM

## 2024-04-25 LAB
ALBUMIN SERPL-MCNC: 4.3 G/DL (ref 3.2–4.8)
ALBUMIN/GLOB SERPL: 1.7 {RATIO} (ref 1–2)
ALP LIVER SERPL-CCNC: 71 U/L
ALT SERPL-CCNC: 19 U/L
ANION GAP SERPL CALC-SCNC: 8 MMOL/L (ref 0–18)
AST SERPL-CCNC: 29 U/L (ref ?–34)
BASOPHILS # BLD AUTO: 0.09 X10(3) UL (ref 0–0.2)
BASOPHILS NFR BLD AUTO: 1.4 %
BILIRUB SERPL-MCNC: 0.5 MG/DL (ref 0.3–1.2)
BUN BLD-MCNC: 6 MG/DL (ref 9–23)
BUN/CREAT SERPL: 6.8 (ref 10–20)
CALCIUM BLD-MCNC: 9.1 MG/DL (ref 8.7–10.4)
CHLORIDE SERPL-SCNC: 106 MMOL/L (ref 98–112)
CHOLEST SERPL-MCNC: 150 MG/DL (ref ?–200)
CO2 SERPL-SCNC: 26 MMOL/L (ref 21–32)
CREAT BLD-MCNC: 0.88 MG/DL
DEPRECATED RDW RBC AUTO: 39.3 FL (ref 35.1–46.3)
EGFRCR SERPLBLD CKD-EPI 2021: 89 ML/MIN/1.73M2 (ref 60–?)
EOSINOPHIL # BLD AUTO: 0.89 X10(3) UL (ref 0–0.7)
EOSINOPHIL NFR BLD AUTO: 14.1 %
ERYTHROCYTE [DISTWIDTH] IN BLOOD BY AUTOMATED COUNT: 11.9 % (ref 11–15)
FASTING PATIENT LIPID ANSWER: YES
FASTING STATUS PATIENT QL REPORTED: YES
GLOBULIN PLAS-MCNC: 2.6 G/DL (ref 2.8–4.4)
GLUCOSE BLD-MCNC: 116 MG/DL (ref 70–99)
HCT VFR BLD AUTO: 42.1 %
HDLC SERPL-MCNC: 59 MG/DL (ref 40–59)
HGB BLD-MCNC: 13.7 G/DL
IMM GRANULOCYTES # BLD AUTO: 0.01 X10(3) UL (ref 0–1)
IMM GRANULOCYTES NFR BLD: 0.2 %
LDLC SERPL CALC-MCNC: 80 MG/DL (ref ?–100)
LYMPHOCYTES # BLD AUTO: 1.7 X10(3) UL (ref 1–4)
LYMPHOCYTES NFR BLD AUTO: 26.9 %
MCH RBC QN AUTO: 29.4 PG (ref 26–34)
MCHC RBC AUTO-ENTMCNC: 32.5 G/DL (ref 31–37)
MCV RBC AUTO: 90.3 FL
MONOCYTES # BLD AUTO: 0.48 X10(3) UL (ref 0.1–1)
MONOCYTES NFR BLD AUTO: 7.6 %
NEUTROPHILS # BLD AUTO: 3.16 X10 (3) UL (ref 1.5–7.7)
NEUTROPHILS # BLD AUTO: 3.16 X10(3) UL (ref 1.5–7.7)
NEUTROPHILS NFR BLD AUTO: 49.8 %
NONHDLC SERPL-MCNC: 91 MG/DL (ref ?–130)
OSMOLALITY SERPL CALC.SUM OF ELEC: 289 MOSM/KG (ref 275–295)
PLATELET # BLD AUTO: 339 10(3)UL (ref 150–450)
POTASSIUM SERPL-SCNC: 4.3 MMOL/L (ref 3.5–5.1)
PROT SERPL-MCNC: 6.9 G/DL (ref 5.7–8.2)
RBC # BLD AUTO: 4.66 X10(6)UL
SODIUM SERPL-SCNC: 140 MMOL/L (ref 136–145)
TRIGL SERPL-MCNC: 52 MG/DL (ref 30–149)
TSI SER-ACNC: 0.88 MIU/ML (ref 0.55–4.78)
VIT B12 SERPL-MCNC: 363 PG/ML (ref 211–911)
VIT D+METAB SERPL-MCNC: 21.4 NG/ML (ref 30–100)
VLDLC SERPL CALC-MCNC: 8 MG/DL (ref 0–30)
WBC # BLD AUTO: 6.3 X10(3) UL (ref 4–11)

## 2024-04-25 PROCEDURE — 80053 COMPREHEN METABOLIC PANEL: CPT

## 2024-04-25 PROCEDURE — 36415 COLL VENOUS BLD VENIPUNCTURE: CPT

## 2024-04-25 PROCEDURE — 84443 ASSAY THYROID STIM HORMONE: CPT

## 2024-04-25 PROCEDURE — 80061 LIPID PANEL: CPT

## 2024-04-25 PROCEDURE — 82306 VITAMIN D 25 HYDROXY: CPT

## 2024-04-25 PROCEDURE — 82607 VITAMIN B-12: CPT

## 2024-04-25 PROCEDURE — 85025 COMPLETE CBC W/AUTO DIFF WBC: CPT

## 2024-04-29 ENCOUNTER — TELEPHONE (OUTPATIENT)
Dept: FAMILY MEDICINE CLINIC | Facility: CLINIC | Age: 34
End: 2024-04-29

## 2024-04-29 NOTE — TELEPHONE ENCOUNTER
Called [facility] and spoke with Elaine. Advised that per BLAINE Hinojosa, ok to release Adderall early as patient is going out of [place] this evening. Pharmacist verbalized understanding and will release medication.

## 2024-04-29 NOTE — TELEPHONE ENCOUNTER
Patty Buchanan, APRN- patient is asking if it is ok to for us to call [place] to give an ok for an early release on Adderall as she is going out of state this evening and it not due until Thursday. Please advise. Thank you

## 2024-05-28 DIAGNOSIS — F90.9 ATTENTION DEFICIT HYPERACTIVITY DISORDER (ADHD), UNSPECIFIED ADHD TYPE: ICD-10-CM

## 2024-05-29 RX ORDER — DEXTROAMPHETAMINE SACCHARATE, AMPHETAMINE ASPARTATE, DEXTROAMPHETAMINE SULFATE AND AMPHETAMINE SULFATE 2.5; 2.5; 2.5; 2.5 MG/1; MG/1; MG/1; MG/1
30 TABLET ORAL DAILY
Qty: 90 TABLET | Refills: 0 | Status: SHIPPED | OUTPATIENT
Start: 2024-05-29

## 2024-05-29 NOTE — TELEPHONE ENCOUNTER
Review pended refill request as it does not fall under a protocol.    Last filled: 4/29/24  Last visit: 4/29/24    Patient will be out of medication soon

## 2024-06-25 DIAGNOSIS — F90.9 ATTENTION DEFICIT HYPERACTIVITY DISORDER (ADHD), UNSPECIFIED ADHD TYPE: ICD-10-CM

## 2024-06-27 RX ORDER — DEXTROAMPHETAMINE SACCHARATE, AMPHETAMINE ASPARTATE, DEXTROAMPHETAMINE SULFATE AND AMPHETAMINE SULFATE 2.5; 2.5; 2.5; 2.5 MG/1; MG/1; MG/1; MG/1
30 TABLET ORAL DAILY
Qty: 90 TABLET | Refills: 0 | Status: SHIPPED | OUTPATIENT
Start: 2024-06-27

## 2024-06-27 NOTE — TELEPHONE ENCOUNTER
Please review. Protocol Failed; No Protocol    Recent fills: 4/4/2024, 4/29/2024, 5/29/2024  Last Rx written: 5/29/2024  Last office visit: 8/16/2023  Telvisit: 1/17/2024    Recent Visits  Date Type Provider Dept   04/29/24 Office Visit Patty Buchanan APRN [facility]               Requested Prescriptions   Pending Prescriptions Disp Refills    amphetamine-dextroamphetamine (ADDERALL) 10 MG Oral Tab 90 tablet 0     Sig: Take 3 tablets (30 mg total) by mouth daily.       Controlled Substance Medication Failed - 6/25/2024  7:27 AM        Failed - This medication is a controlled substance - forward to provider to refill                 Recent Outpatient Visits              1 month ago Attention deficit hyperactivity disorder (ADHD), unspecified ADHD type    [facility]    Office Visit    5 months ago Annual physical exam    [facility]    Telemedicine    10 months ago Attention deficit hyperactivity disorder (ADHD), unspecified ADHD type    [facility]    Office Visit    1 year ago Subacute vaginitis    [facility]    Office Visit    2 years ago Bipolar disorder in full remission, most recent episode unspecified type (HCC)    [facility]    Office Visit

## 2024-07-19 DIAGNOSIS — F90.9 ATTENTION DEFICIT HYPERACTIVITY DISORDER (ADHD), UNSPECIFIED ADHD TYPE: ICD-10-CM

## 2024-07-23 RX ORDER — DEXTROAMPHETAMINE SACCHARATE, AMPHETAMINE ASPARTATE, DEXTROAMPHETAMINE SULFATE AND AMPHETAMINE SULFATE 2.5; 2.5; 2.5; 2.5 MG/1; MG/1; MG/1; MG/1
30 TABLET ORAL DAILY
Qty: 90 TABLET | Refills: 0 | Status: SHIPPED | OUTPATIENT
Start: 2024-07-23 | End: 2024-08-18

## 2024-07-23 NOTE — TELEPHONE ENCOUNTER
Please review. Protocol Failed; No Protocol      Recent fills: 4/29/2024, 5/29/2024, 6/27/2024  Last Rx written: 6/27/2024  DUE: 7/28/2024  Last office visit: 8/16/2023    Recent Visits  Date Type Provider Dept   04/29/24 Office Visit Patty Buchanan APRN [facility]           Requested Prescriptions   Pending Prescriptions Disp Refills    amphetamine-dextroamphetamine (ADDERALL) 10 MG Oral Tab 90 tablet 0     Sig: Take 3 tablets (30 mg total) by mouth daily.       Controlled Substance Medication Failed - 7/19/2024  6:35 PM        Failed - This medication is a controlled substance - forward to provider to refill                 Recent Outpatient Visits              2 months ago Attention deficit hyperactivity disorder (ADHD), unspecified ADHD type    [facility]    Office Visit    6 months ago Annual physical exam    [facility], Etelvina Gonsalez MD    Telemedicine    11 months ago Attention deficit hyperactivity disorder (ADHD), unspecified ADHD type    [facility]    Office Visit    1 year ago Subacute vaginitis    [facility]    Office Visit    2 years ago Bipolar disorder in full remission, most recent episode unspecified type (HCC)    [facility]    Office Visit

## 2024-08-18 DIAGNOSIS — F90.9 ATTENTION DEFICIT HYPERACTIVITY DISORDER (ADHD), UNSPECIFIED ADHD TYPE: ICD-10-CM

## 2024-08-19 NOTE — TELEPHONE ENCOUNTER
Patient needs to change the pharmacy for her Adderall refill to:    [facility], [facility] - [place], [place] - [address] [place] [address] [phone], [phone] [[id number]]

## 2024-08-21 RX ORDER — DEXTROAMPHETAMINE SACCHARATE, AMPHETAMINE ASPARTATE, DEXTROAMPHETAMINE SULFATE AND AMPHETAMINE SULFATE 2.5; 2.5; 2.5; 2.5 MG/1; MG/1; MG/1; MG/1
30 TABLET ORAL DAILY
Qty: 90 TABLET | Refills: 0 | Status: SHIPPED | OUTPATIENT
Start: 2024-08-21 | End: 2024-09-13

## 2024-08-21 NOTE — TELEPHONE ENCOUNTER
Please Review. Protocol Failed; No Protocol   Medication(s) to Refill:   Requested Prescriptions     Pending Prescriptions Disp Refills    amphetamine-dextroamphetamine (ADDERALL) 10 MG Oral Tab 90 tablet 0     Sig: Take 3 tablets (30 mg total) by mouth daily.         Reason for Medication Refill being sent to Provider / Reason Protocol Failed:  [x] Controlled Substance       Quantity: 90   Last Time Medication was Filled:  07/25/2024, 06/27/2024, 05/29/2024  Last Time Medication was Written : 07/23/2024      Last Office Visit : 04/29/2024            Requested Prescriptions   Pending Prescriptions Disp Refills    amphetamine-dextroamphetamine (ADDERALL) 10 MG Oral Tab 90 tablet 0     Sig: Take 3 tablets (30 mg total) by mouth daily.       Controlled Substance Medication Failed - 8/19/2024  1:08 PM        Failed - This medication is a controlled substance - forward to provider to refill                 Recent Outpatient Visits              3 months ago Attention deficit hyperactivity disorder (ADHD), unspecified ADHD type    [facility]    Office Visit    7 months ago Annual physical exam    [facility]    Telemedicine    1 year ago Attention deficit hyperactivity disorder (ADHD), unspecified ADHD type    [facility]    Office Visit    1 year ago Subacute vaginitis    [facility] Laura Scales Tanja, MD    Office Visit    2 years ago Bipolar disorder in full remission, most recent episode unspecified type (HCC)    [facility] Laura Scales Tanja, MD    Office Visit

## 2024-09-13 DIAGNOSIS — F90.9 ATTENTION DEFICIT HYPERACTIVITY DISORDER (ADHD), UNSPECIFIED ADHD TYPE: ICD-10-CM

## 2024-09-17 NOTE — TELEPHONE ENCOUNTER
Please review. Protocol Failed; No Protocol      Recent fills: 5/29/2024, 7/25/2024, 8/22/2024  Last Rx written: 8/21/2024  Last office visit: 4/29/2024        Requested Prescriptions   Pending Prescriptions Disp Refills    amphetamine-dextroamphetamine (ADDERALL) 10 MG Oral Tab 90 tablet 0     Sig: Take 3 tablets (30 mg total) by mouth daily.       Controlled Substance Medication Failed - 9/13/2024  7:54 AM        Failed - This medication is a controlled substance - forward to provider to refill                 Recent Outpatient Visits              4 months ago Attention deficit hyperactivity disorder (ADHD), unspecified ADHD type    [facility]    Office Visit    8 months ago Annual physical exam    [facility]    [facility]    1 year ago Attention deficit hyperactivity disorder (ADHD), unspecified ADHD type    [facility]    Office Visit    2 years ago Subacute vaginitis    [facility] Laura Scales Tanja, MD    Office Visit    2 years ago Bipolar disorder in full remission, most recent episode unspecified type (HCC)    [facility] Laura Scales Tanja, MD    Office Visit

## 2024-09-18 RX ORDER — DEXTROAMPHETAMINE SACCHARATE, AMPHETAMINE ASPARTATE, DEXTROAMPHETAMINE SULFATE AND AMPHETAMINE SULFATE 2.5; 2.5; 2.5; 2.5 MG/1; MG/1; MG/1; MG/1
30 TABLET ORAL DAILY
Qty: 90 TABLET | Refills: 0 | Status: SHIPPED | OUTPATIENT
Start: 2024-09-18

## 2024-10-16 DIAGNOSIS — F90.9 ATTENTION DEFICIT HYPERACTIVITY DISORDER (ADHD), UNSPECIFIED ADHD TYPE: ICD-10-CM

## 2024-10-17 RX ORDER — DEXTROAMPHETAMINE SACCHARATE, AMPHETAMINE ASPARTATE, DEXTROAMPHETAMINE SULFATE AND AMPHETAMINE SULFATE 2.5; 2.5; 2.5; 2.5 MG/1; MG/1; MG/1; MG/1
30 TABLET ORAL DAILY
Qty: 90 TABLET | Refills: 0 | Status: SHIPPED | OUTPATIENT
Start: 2024-10-17 | End: 2024-11-18

## 2024-10-17 NOTE — TELEPHONE ENCOUNTER
Patient called, verified Name and . She is requesting for refill of Adderall. She is hoping to get the refill today as she is leaving out of town tomorrow afternoon. Requesting refill sent to [facility] in [place]. Medication pended to run through protocol. Verified pharmacy.

## 2024-10-17 NOTE — TELEPHONE ENCOUNTER
Please review; protocol failed/ has no protocol        Viola Sutherland, RN3 minutes ago (1:02 PM)     JF  Patient called, verified Name and . She is requesting for refill of Adderall. She is hoping to get the refill today as she is leaving out of town tomorrow afternoon. Requesting refill sent to [facility] in [place]. Medication pended to run through protocol. Verified pharmacy.         Recent fills: 2024,2024,2024  Last Rx written: 2024  Last Office Visit: 2024    Recent Visits  Date Type Provider Dept   24 Office Visit Patty Buchanan APRN [facility]       Requested Prescriptions   Pending Prescriptions Disp Refills    amphetamine-dextroamphetamine (ADDERALL) 10 MG Oral Tab 90 tablet 0     Sig: Take 3 tablets (30 mg total) by mouth daily.       Controlled Substance Medication Failed - 10/17/2024  1:06 PM        Failed - This medication is a controlled substance - forward to provider to refill           Recent Outpatient Visits              5 months ago Attention deficit hyperactivity disorder (ADHD), unspecified ADHD type    [facility] Patty Buchanan APRN    Office Visit    9 months ago Annual physical exam    [facility]    [facility]    1 year ago Attention deficit hyperactivity disorder (ADHD), unspecified ADHD type    [facility]    Office Visit    2 years ago Subacute vaginitis    [facility]    Office Visit    2 years ago Bipolar disorder in full remission, most recent episode unspecified type (HCC)    [facility]    Office Visit

## 2024-10-17 NOTE — TELEPHONE ENCOUNTER
Message noted: Chart reviewed and may refill medication as requested. Prescription sent to listed pharmacy. Please notify patient. Further refills as per Patty Buchanan.

## 2024-11-14 DIAGNOSIS — F90.9 ATTENTION DEFICIT HYPERACTIVITY DISORDER (ADHD), UNSPECIFIED ADHD TYPE: ICD-10-CM

## 2024-11-15 NOTE — TELEPHONE ENCOUNTER
Please review. Protocol Failed; No Protocol      Recent fills: 8/22/2024, 9/19/2024, 10/17/2024  Last Rx written: 10/17/2024  Last office visit: 8/16/2023    Recent Visits  Date Type Provider Dept   04/29/24 Office Visit Patty Buchanan APRN [facility]           Requested Prescriptions   Pending Prescriptions Disp Refills    amphetamine-dextroamphetamine (ADDERALL) 10 MG Oral Tab 90 tablet 0     Sig: Take 3 tablets (30 mg total) by mouth daily.       Controlled Substance Medication Failed - 11/15/2024 11:27 AM        Failed - This medication is a controlled substance - forward to provider to refill                 Recent Outpatient Visits              6 months ago Attention deficit hyperactivity disorder (ADHD), unspecified ADHD type    [facility] Laura Scales Jennifer, APRN    Office Visit    10 months ago Annual physical exam    [facility]    Telemedicine    1 year ago Attention deficit hyperactivity disorder (ADHD), unspecified ADHD type    [facility] Laura Scales Tanja, MD    Office Visit    2 years ago Subacute vaginitis    [facility]    Office Visit    2 years ago Bipolar disorder in full remission, most recent episode unspecified type (HCC)    [facility]    Office Visit

## 2024-11-15 NOTE — TELEPHONE ENCOUNTER
Patient calling for status of refill request,verified name and date of birth.   Patient reports that she is out of generic adderall 10 mg. Pharmacy verified.  Medication  pended to run through protocol.

## 2024-11-15 NOTE — TELEPHONE ENCOUNTER
Patient called, verified Name and . Following up on refill of Adderall. States she only has the dose left for tomorrow and will be out.    BLAINE Brambila  (for Dr. Gomez) please advise.

## 2024-11-18 RX ORDER — DEXTROAMPHETAMINE SACCHARATE, AMPHETAMINE ASPARTATE, DEXTROAMPHETAMINE SULFATE AND AMPHETAMINE SULFATE 7.5; 7.5; 7.5; 7.5 MG/1; MG/1; MG/1; MG/1
30 TABLET ORAL DAILY
Qty: 30 TABLET | Refills: 0 | OUTPATIENT
Start: 2024-11-18

## 2024-11-18 RX ORDER — DEXTROAMPHETAMINE SACCHARATE, AMPHETAMINE ASPARTATE, DEXTROAMPHETAMINE SULFATE AND AMPHETAMINE SULFATE 2.5; 2.5; 2.5; 2.5 MG/1; MG/1; MG/1; MG/1
30 TABLET ORAL DAILY
Qty: 90 TABLET | Refills: 0 | OUTPATIENT
Start: 2024-11-18

## 2024-11-18 NOTE — TELEPHONE ENCOUNTER
Patient has not been seen in over 6 months.   Advise patient to make appointment   APRN will defer to Dr. Gomez.

## 2024-11-18 NOTE — TELEPHONE ENCOUNTER
Verified name and .    Patient states that pharmacy does not have enough Adderall 10 mg  to fulfill prescription sent by BLAINE Restrepo, to take three tablets once daily, however, they have they do have enough Adderall 30 mg if filled for 30 tablets.    She is asking for new prescription to be sent for Adderall 30 mg to take one tablet daily instead.    Medication pended for your review and approval.

## 2024-11-18 NOTE — TELEPHONE ENCOUNTER
Verified name and .    Patient is calling to follow up on refill request- states that she is now out of medication at this time.    She is aware that Dr. Gomez is out of office today and is requesting that message be sent to covering provider for assistance with getting prescription filled today.    Routing to pod mate, Patty VALLADARES, for assistance since Dr. Gomez is out of office.    Please see pended medication.

## 2024-11-20 DIAGNOSIS — F90.9 ATTENTION DEFICIT HYPERACTIVITY DISORDER (ADHD), UNSPECIFIED ADHD TYPE: ICD-10-CM

## 2024-11-20 RX ORDER — DEXTROAMPHETAMINE SACCHARATE, AMPHETAMINE ASPARTATE, DEXTROAMPHETAMINE SULFATE AND AMPHETAMINE SULFATE 7.5; 7.5; 7.5; 7.5 MG/1; MG/1; MG/1; MG/1
30 TABLET ORAL DAILY
Qty: 30 TABLET | Refills: 0 | Status: CANCELLED | OUTPATIENT
Start: 2024-11-20

## 2024-11-20 NOTE — TELEPHONE ENCOUNTER
Please speak with patient to inquire if she is okay with 30 mg tablet. She verbalized in office that she preferred the 10 mg tablets.

## 2024-11-20 NOTE — TELEPHONE ENCOUNTER
Patient states all of the other pharmacies do not have 10mg available, either in back order or not in stock.  Patient states [facility] has the 30mg available, and can take 30mg.  Please advise.

## 2024-11-20 NOTE — TELEPHONE ENCOUNTER
Spoke with Imelda at [facility], Date of Birth verified  She stated they have Adderall 30 mg available not the 10 mg dosage.   pls advise, thanks in advance.   Rx  pended.     Requested Prescriptions     Pending Prescriptions Disp Refills    amphetamine-dextroamphetamine (ADDERALL) 30 MG Oral Tab 30 tablet 0     Sig: Take 1 tablet (30 mg total) by mouth daily.       Last Office Visit with PCP: 1/17/2024

## 2024-11-21 NOTE — TELEPHONE ENCOUNTER
Pharmacy    [id number] - [address] [phone], [phone]      Disp Refills Start End    amphetamine-dextroamphetamine (ADDERALL) 30 MG Oral Tab 30 tablet 0 11/20/2024 --    Sig - Route: Take 1 tablet (30 mg total) by mouth daily. - Oral    Sent to pharmacy as: Amphetamine-Dextroamphetamine 30 MG Oral Tablet (Adderall)    Earliest Fill Date: 11/20/2024    E-Prescribing Status: Receipt confirmed by pharmacy (11/20/2024  1:50 PM CST)

## 2024-12-16 DIAGNOSIS — F90.9 ATTENTION DEFICIT HYPERACTIVITY DISORDER (ADHD), UNSPECIFIED ADHD TYPE: ICD-10-CM

## 2024-12-16 NOTE — TELEPHONE ENCOUNTER
Please review.  Protocol failed / Has no protocol.    Asthma / COPD not on current Problem List. Is refill appropriate?     Requested Prescriptions   Pending Prescriptions Disp Refills    albuterol 108 (90 Base) MCG/ACT Inhalation Aero Soln 18 g 0     Sig: Inhale 2 puffs into the lungs every 4 (four) hours as needed.       Asthma & COPD Medication Protocol Failed - 12/16/2024  3:08 PM        Failed - ACT Score greater than or equal to 20                Failed - ACT recorded in the last 12 months                Passed - Appointment in past 6 or next 3 months      Recent Outpatient Visits              4 weeks ago Attention deficit hyperactivity disorder (ADHD), unspecified ADHD type    [facility], [address], Patty Parnell APRN    Office Visit    7 months ago Attention deficit hyperactivity disorder (ADHD), unspecified ADHD type    [facility], [address], Patty Parnell APRN    Office Visit    11 months ago Annual physical exam    [facility]    [facility]    1 year ago Attention deficit hyperactivity disorder (ADHD), unspecified ADHD type    [facility]    Office Visit    2 years ago [facility]acute vag[facility]    [facility]          Future Appointments         Provider Department Appt Notes    In 2 days Brenda Alexander CNM [facility]note 3[facility] feels like it might have opened                       Future Appointments         Provider Department Appt Notes    In 2 days Brenda Alexander [facility]note 3[facility] feels like it might have opened          Recent Outpatient Visits              4 weeks ago Attention deficit hyperactivity disorder (ADHD),  unspecified ADHD type    [facility], [address], Patty Parnell APRN    Office Visit    7 months ago Attention deficit hyperactivity disorder (ADHD), unspecified ADHD type    [facility], [address], Patty Parnell APRN    Office Visit    11 months ago Annual physical exam    [facility], [facility], Etelvina Gonsalez MD    Telemedicine    1 year ago Attention deficit hyperactivity disorder (ADHD), unspecified ADHD type    [facility], Etelvina Gonsalez MD    Office Visit    2 years ago Subacute vaginitis    [facility], [facility], Etelvina Gonsalez MD    Office Visit

## 2024-12-17 RX ORDER — ALBUTEROL SULFATE 90 UG/1
2 INHALANT RESPIRATORY (INHALATION) EVERY 4 HOURS PRN
Qty: 18 G | Refills: 0 | Status: SHIPPED | OUTPATIENT
Start: 2024-12-17

## 2024-12-19 RX ORDER — DEXTROAMPHETAMINE SACCHARATE, AMPHETAMINE ASPARTATE, DEXTROAMPHETAMINE SULFATE AND AMPHETAMINE SULFATE 7.5; 7.5; 7.5; 7.5 MG/1; MG/1; MG/1; MG/1
30 TABLET ORAL DAILY
Qty: 30 TABLET | Refills: 0 | Status: SHIPPED | OUTPATIENT
Start: 2024-12-19

## 2024-12-19 NOTE — TELEPHONE ENCOUNTER
Patty out of office , LOV 11/18/24, sent to Pod ghazala -Dr Us    Rx requested 12/16/24  Rx Volume high

## 2024-12-19 NOTE — TELEPHONE ENCOUNTER
Patty Buchanan please advise, Patient calling and requesting update on refill for Adderall, prescription pending for approval. She is out after today.

## 2025-01-12 DIAGNOSIS — F90.9 ATTENTION DEFICIT HYPERACTIVITY DISORDER (ADHD), UNSPECIFIED ADHD TYPE: ICD-10-CM

## 2025-01-13 RX ORDER — DEXTROAMPHETAMINE SACCHARATE, AMPHETAMINE ASPARTATE, DEXTROAMPHETAMINE SULFATE AND AMPHETAMINE SULFATE 7.5; 7.5; 7.5; 7.5 MG/1; MG/1; MG/1; MG/1
30 TABLET ORAL DAILY
Qty: 30 TABLET | Refills: 0 | Status: SHIPPED | OUTPATIENT
Start: 2025-01-13 | End: 2025-02-06

## 2025-01-16 RX ORDER — ALBUTEROL SULFATE 90 UG/1
2 INHALANT RESPIRATORY (INHALATION) EVERY 4 HOURS PRN
Qty: 18 G | Refills: 0 | Status: SHIPPED | OUTPATIENT
Start: 2025-01-16 | End: 2025-03-11

## 2025-01-16 NOTE — TELEPHONE ENCOUNTER
Please Review. Protocol Failed; No Protocol     Requested Prescriptions   Pending Prescriptions Disp Refills    albuterol 108 (90 Base) MCG/ACT Inhalation Aero Soln 18 g 0     Sig: Inhale 2 puffs into the lungs every 4 (four) hours as needed.       Asthma & COPD Medication Protocol Failed - 1/16/2025 10:04 AM        Failed - ACT Score greater than or equal to 20                Failed - ACT recorded in the last 12 months                Passed - Appointment in past 6 or next 3 months      Recent Outpatient Visits              1 month ago Attention deficit hyperactivity disorder (ADHD), unspecified ADHD type    [facility]    Office Visit    8 months ago Attention deficit hyperactivity disorder (ADHD), unspecified ADHD type    [facility], APR[facility]    Office Visit    1 year ago Annual physical exam    [facility]    Tele[facility]icine    1 year ago Attention deficit hyperactivity disorder (ADHD), unspecified ADHD type    [facility] Laura Scales Tanja, MD    Office Visit    2 years ago Subacute vaginitis    [facility]    Office Visit                      Passed - Medication is active on med list                 Recent Outpatient Visits              1 month ago Attention deficit hyperactivity disorder (ADHD), unspecified ADHD type    [facility], APRN    Office Visit    8 months ago Attention deficit hyperactivity disorder (ADHD), unspecified ADHD type    [facility] [facility], APRN    Office Visit    1 year ago Annual physical exam    [facility]    Telemedicine     1 year ago Attention deficit hyperactivity disorder (ADHD), unspecified ADHD type    [facility], Etelvina Gonsalez MD    Office Visit    2 years ago Subacute vaginitis    [facility], Etelvina Gonsalez MD    Office Visit

## 2025-02-06 DIAGNOSIS — F90.9 ATTENTION DEFICIT HYPERACTIVITY DISORDER (ADHD), UNSPECIFIED ADHD TYPE: ICD-10-CM

## 2025-02-11 RX ORDER — DEXTROAMPHETAMINE SACCHARATE, AMPHETAMINE ASPARTATE, DEXTROAMPHETAMINE SULFATE AND AMPHETAMINE SULFATE 7.5; 7.5; 7.5; 7.5 MG/1; MG/1; MG/1; MG/1
30 TABLET ORAL DAILY
Qty: 30 TABLET | Refills: 0 | Status: SHIPPED | OUTPATIENT
Start: 2025-02-11 | End: 2025-03-07

## 2025-02-11 NOTE — TELEPHONE ENCOUNTER
Please review. Protocol Failed; No Protocol      Recent fills: 12/19/2024, 1/16/2025  Last Rx written: 1/13/2025  Last office visit: 11/18/2024          Requested Prescriptions   Pending Prescriptions Disp Refills    amphetamine-dextroamphetamine (ADDERALL) 30 MG Oral Tab 30 tablet 0     Sig: Take 1 tablet (30 mg total) by mouth daily.       Controlled Substance Medication Failed - 2/11/2025 10:12 AM        Failed - This medication is a controlled substance - forward to provider to refill        Passed - Medication is active on med list                 Recent Outpatient Visits              2 months ago Attention deficit hyperactivity disorder (ADHD), unspecified ADHD type    [facility] [address] [facility] Jennifer, APRN    Office Visit    9 months ago Attention deficit hyperactivity disorder (ADHD), unspecified ADHD type    [facility] [facility] Jennifer, APRN    Office Visit    1 year ago Annual physical exam    [facility]    Telemedicine    1 year ago Attention deficit hyperactivity disorder (ADHD), unspecified ADHD type    [facility]    Office Visit    2 years ago Subacute vaginitis    [facility]    Office Visit

## 2025-03-07 DIAGNOSIS — F90.9 ATTENTION DEFICIT HYPERACTIVITY DISORDER (ADHD), UNSPECIFIED ADHD TYPE: ICD-10-CM

## 2025-03-11 NOTE — TELEPHONE ENCOUNTER
The patient calling back asking about the refill request.  She asked the message sent as a high priority which I stated it already was.

## 2025-03-11 NOTE — TELEPHONE ENCOUNTER
Pt calling to p/u RX today leaving going on a trip for 3-4 days , plans to make a 6 mth appt - requested to send to Dr Gomez -stated she see rochelle when she can't see her Dr Gibson pended refill request as it does not fall under a protocol.    Last Rx: 2/11/25 but p/u 3/14/25  LOV: 11/18/24

## 2025-03-12 RX ORDER — DEXTROAMPHETAMINE SACCHARATE, AMPHETAMINE ASPARTATE, DEXTROAMPHETAMINE SULFATE AND AMPHETAMINE SULFATE 7.5; 7.5; 7.5; 7.5 MG/1; MG/1; MG/1; MG/1
30 TABLET ORAL DAILY
Qty: 30 TABLET | Refills: 0 | Status: SHIPPED | OUTPATIENT
Start: 2025-03-12

## 2025-03-12 NOTE — TELEPHONE ENCOUNTER
Pt stated rx was sent but need nurse to call to approve early refill, spoke to pharmacy , advised  approved rx early p/u

## 2025-03-12 NOTE — TELEPHONE ENCOUNTER
To pharmacy: Needs to make an apt for further refills     Future Appointments   Date Time Provider Department Center   5/20/2025  9:50 AM [facility]

## 2025-03-15 RX ORDER — ALBUTEROL SULFATE 90 UG/1
2 INHALANT RESPIRATORY (INHALATION) EVERY 4 HOURS PRN
Qty: 18 G | Refills: 0 | Status: SHIPPED | OUTPATIENT
Start: 2025-03-15 | End: 2025-05-20

## 2025-04-09 DIAGNOSIS — F90.9 ATTENTION DEFICIT HYPERACTIVITY DISORDER (ADHD), UNSPECIFIED ADHD TYPE: ICD-10-CM

## 2025-04-09 NOTE — TELEPHONE ENCOUNTER
Patient called requesting refill of Adderall 30 mg tablets to [facility] in [place], Medication pended.

## 2025-04-11 RX ORDER — DEXTROAMPHETAMINE SACCHARATE, AMPHETAMINE ASPARTATE, DEXTROAMPHETAMINE SULFATE AND AMPHETAMINE SULFATE 7.5; 7.5; 7.5; 7.5 MG/1; MG/1; MG/1; MG/1
30 TABLET ORAL DAILY
Qty: 30 TABLET | Refills: 0 | Status: SHIPPED | OUTPATIENT
Start: 2025-04-11

## 2025-04-11 NOTE — TELEPHONE ENCOUNTER
Patient has been calling for her refill on Adderall and will be out of the medication tomorrow. Please see the pended script below and send of approve    Review pended refill request as it does not fall under a protocol.    Last filled: 3/12/25  Last Office Visit: 11/18/25

## 2025-05-07 DIAGNOSIS — F90.9 ATTENTION DEFICIT HYPERACTIVITY DISORDER (ADHD), UNSPECIFIED ADHD TYPE: ICD-10-CM

## 2025-05-07 NOTE — TELEPHONE ENCOUNTER
Spoke with patient, Date of Birth verified.  Pt req for rx med refill, Rx pended.  Routed to [facility] Ref[facility] to run for protocol , thanks.     Requested Prescriptions     Pending Prescriptions Disp Refills    amphetamine-dextroamphetamine (ADDERALL) 30 MG Oral Tab 30 tablet 0     Sig: Take 1 tablet (30 mg total) by mouth daily.

## 2025-05-07 NOTE — TELEPHONE ENCOUNTER
Please review: medication fails/has no protocol attached.    Future Appointments   Date Time Provider Department Center   5/20/2025  9:50 AM Etelvina Gomez MD [facility] [place]     Recent fill dates: 4/11/25, 3/12/25, and 2/14/25  Date of  last written prescription: 4/11/25   Last dispensed quantity: #30 each and processed as a 30 day supply  Refill date: 5/16/2025  Last office visit: 11/18/24 (BLAINE Buchanan)  [] Takes as needed  [x] Takes scheduled daily

## 2025-05-08 RX ORDER — DEXTROAMPHETAMINE SACCHARATE, AMPHETAMINE ASPARTATE, DEXTROAMPHETAMINE SULFATE AND AMPHETAMINE SULFATE 7.5; 7.5; 7.5; 7.5 MG/1; MG/1; MG/1; MG/1
30 TABLET ORAL DAILY
Qty: 30 TABLET | Refills: 0 | Status: SHIPPED | OUTPATIENT
Start: 2025-05-08

## 2025-05-20 ENCOUNTER — LAB ENCOUNTER (OUTPATIENT)
Dept: LAB | Age: 35
End: 2025-05-20
Attending: FAMILY MEDICINE
Payer: MEDICAID

## 2025-05-20 ENCOUNTER — PATIENT MESSAGE (OUTPATIENT)
Dept: FAMILY MEDICINE CLINIC | Facility: CLINIC | Age: 35
End: 2025-05-20

## 2025-05-20 ENCOUNTER — OFFICE VISIT (OUTPATIENT)
Dept: FAMILY MEDICINE CLINIC | Facility: CLINIC | Age: 35
End: 2025-05-20
Payer: MEDICAID

## 2025-05-20 VITALS
WEIGHT: 137 LBS | OXYGEN SATURATION: 100 % | HEIGHT: 66 IN | BODY MASS INDEX: 22.02 KG/M2 | TEMPERATURE: 97 F | SYSTOLIC BLOOD PRESSURE: 131 MMHG | DIASTOLIC BLOOD PRESSURE: 85 MMHG | RESPIRATION RATE: 20 BRPM | HEART RATE: 102 BPM

## 2025-05-20 DIAGNOSIS — F90.0 ATTENTION DEFICIT HYPERACTIVITY DISORDER (ADHD), PREDOMINANTLY INATTENTIVE TYPE: ICD-10-CM

## 2025-05-20 DIAGNOSIS — N92.6 MENSTRUAL PERIOD LATE: Primary | ICD-10-CM

## 2025-05-20 DIAGNOSIS — Z00.00 ANNUAL PHYSICAL EXAM: ICD-10-CM

## 2025-05-20 DIAGNOSIS — M25.50 ARTHRALGIA, UNSPECIFIED JOINT: ICD-10-CM

## 2025-05-20 LAB
ALBUMIN SERPL-MCNC: 4.8 G/DL (ref 3.2–4.8)
ALBUMIN/GLOB SERPL: 2.1 {RATIO} (ref 1–2)
ALP LIVER SERPL-CCNC: 62 U/L (ref 37–98)
ALT SERPL-CCNC: 16 U/L (ref 10–49)
ANION GAP SERPL CALC-SCNC: 8 MMOL/L (ref 0–18)
AST SERPL-CCNC: 23 U/L (ref ?–34)
BASOPHILS # BLD AUTO: 0.07 X10(3) UL (ref 0–0.2)
BASOPHILS NFR BLD AUTO: 1.1 %
BILIRUB SERPL-MCNC: 0.9 MG/DL (ref 0.3–1.2)
BUN BLD-MCNC: 8 MG/DL (ref 9–23)
BUN/CREAT SERPL: 8.7 (ref 10–20)
CALCIUM BLD-MCNC: 9.2 MG/DL (ref 8.7–10.4)
CHLORIDE SERPL-SCNC: 103 MMOL/L (ref 98–112)
CHOLEST SERPL-MCNC: 164 MG/DL (ref ?–200)
CO2 SERPL-SCNC: 26 MMOL/L (ref 21–32)
CONTROL LINE PRESENT WITH A CLEAR BACKGROUND (YES/NO): YES YES/NO
CREAT BLD-MCNC: 0.92 MG/DL (ref 0.55–1.02)
DEPRECATED RDW RBC AUTO: 38.1 FL (ref 35.1–46.3)
EGFRCR SERPLBLD CKD-EPI 2021: 84 ML/MIN/1.73M2 (ref 60–?)
EOSINOPHIL # BLD AUTO: 0.21 X10(3) UL (ref 0–0.7)
EOSINOPHIL NFR BLD AUTO: 3.2 %
ERYTHROCYTE [DISTWIDTH] IN BLOOD BY AUTOMATED COUNT: 11.9 % (ref 11–15)
EST. AVERAGE GLUCOSE BLD GHB EST-MCNC: 108 MG/DL (ref 68–126)
FASTING PATIENT LIPID ANSWER: NO
FASTING STATUS PATIENT QL REPORTED: NO
GLOBULIN PLAS-MCNC: 2.3 G/DL (ref 2–3.5)
GLUCOSE BLD-MCNC: 83 MG/DL (ref 70–99)
HBA1C MFR BLD: 5.4 % (ref ?–5.7)
HCT VFR BLD AUTO: 39.9 % (ref 35–48)
HDLC SERPL-MCNC: 72 MG/DL (ref 40–59)
HGB BLD-MCNC: 13.3 G/DL (ref 12–16)
IMM GRANULOCYTES # BLD AUTO: 0.01 X10(3) UL (ref 0–1)
IMM GRANULOCYTES NFR BLD: 0.2 %
KIT LOT #: NORMAL NUMERIC
LDLC SERPL CALC-MCNC: 83 MG/DL (ref ?–100)
LYMPHOCYTES # BLD AUTO: 2.1 X10(3) UL (ref 1–4)
LYMPHOCYTES NFR BLD AUTO: 31.9 %
MCH RBC QN AUTO: 29.2 PG (ref 26–34)
MCHC RBC AUTO-ENTMCNC: 33.3 G/DL (ref 31–37)
MCV RBC AUTO: 87.7 FL (ref 80–100)
MONOCYTES # BLD AUTO: 0.53 X10(3) UL (ref 0.1–1)
MONOCYTES NFR BLD AUTO: 8 %
NEUTROPHILS # BLD AUTO: 3.67 X10 (3) UL (ref 1.5–7.7)
NEUTROPHILS # BLD AUTO: 3.67 X10(3) UL (ref 1.5–7.7)
NEUTROPHILS NFR BLD AUTO: 55.6 %
NONHDLC SERPL-MCNC: 92 MG/DL (ref ?–130)
OSMOLALITY SERPL CALC.SUM OF ELEC: 281 MOSM/KG (ref 275–295)
PLATELET # BLD AUTO: 322 10(3)UL (ref 150–450)
POTASSIUM SERPL-SCNC: 3.9 MMOL/L (ref 3.5–5.1)
PREGNANCY TEST, URINE: NEGATIVE
PROT SERPL-MCNC: 7.1 G/DL (ref 5.7–8.2)
RBC # BLD AUTO: 4.55 X10(6)UL (ref 3.8–5.3)
SODIUM SERPL-SCNC: 137 MMOL/L (ref 136–145)
TRIGL SERPL-MCNC: 43 MG/DL (ref 30–149)
TSI SER-ACNC: 0.66 UIU/ML (ref 0.55–4.78)
VIT D+METAB SERPL-MCNC: 24.1 NG/ML (ref 30–100)
VLDLC SERPL CALC-MCNC: 7 MG/DL (ref 0–30)
WBC # BLD AUTO: 6.6 X10(3) UL (ref 4–11)

## 2025-05-20 PROCEDURE — 82306 VITAMIN D 25 HYDROXY: CPT

## 2025-05-20 PROCEDURE — 81025 URINE PREGNANCY TEST: CPT | Performed by: FAMILY MEDICINE

## 2025-05-20 PROCEDURE — 85025 COMPLETE CBC W/AUTO DIFF WBC: CPT

## 2025-05-20 PROCEDURE — 83036 HEMOGLOBIN GLYCOSYLATED A1C: CPT

## 2025-05-20 PROCEDURE — 99395 PREV VISIT EST AGE 18-39: CPT | Performed by: FAMILY MEDICINE

## 2025-05-20 PROCEDURE — 84443 ASSAY THYROID STIM HORMONE: CPT

## 2025-05-20 PROCEDURE — 80061 LIPID PANEL: CPT

## 2025-05-20 PROCEDURE — 80053 COMPREHEN METABOLIC PANEL: CPT

## 2025-05-20 PROCEDURE — 36415 COLL VENOUS BLD VENIPUNCTURE: CPT

## 2025-05-20 RX ORDER — DEXTROAMPHETAMINE SACCHARATE, AMPHETAMINE ASPARTATE, DEXTROAMPHETAMINE SULFATE AND AMPHETAMINE SULFATE 7.5; 7.5; 7.5; 7.5 MG/1; MG/1; MG/1; MG/1
30 TABLET ORAL DAILY
Qty: 30 TABLET | Refills: 0 | Status: SHIPPED | OUTPATIENT
Start: 2025-05-20 | End: 2025-06-19

## 2025-05-20 RX ORDER — DEXTROAMPHETAMINE SACCHARATE, AMPHETAMINE ASPARTATE, DEXTROAMPHETAMINE SULFATE AND AMPHETAMINE SULFATE 7.5; 7.5; 7.5; 7.5 MG/1; MG/1; MG/1; MG/1
30 TABLET ORAL DAILY
Qty: 30 TABLET | Refills: 0 | Status: SHIPPED | OUTPATIENT
Start: 2025-06-20 | End: 2025-07-20

## 2025-05-20 RX ORDER — CHOLECALCIFEROL (VITAMIN D3) 50 MCG
1 TABLET ORAL DAILY
Qty: 90 TABLET | Refills: 3 | Status: SHIPPED | OUTPATIENT
Start: 2025-05-20 | End: 2025-06-19

## 2025-05-20 RX ORDER — ALBUTEROL SULFATE 90 UG/1
2 INHALANT RESPIRATORY (INHALATION) EVERY 4 HOURS PRN
Qty: 18 G | Refills: 2 | Status: SHIPPED
Start: 2025-05-20

## 2025-05-20 RX ORDER — DEXTROAMPHETAMINE SACCHARATE, AMPHETAMINE ASPARTATE, DEXTROAMPHETAMINE SULFATE AND AMPHETAMINE SULFATE 7.5; 7.5; 7.5; 7.5 MG/1; MG/1; MG/1; MG/1
30 TABLET ORAL DAILY
Qty: 30 TABLET | Refills: 0 | Status: SHIPPED | OUTPATIENT
Start: 2025-07-21 | End: 2025-08-20

## 2025-05-20 NOTE — PROGRESS NOTES
Subjective:   Patient ID: Jazzy Lucero is a 35 year old female.    HPI  Here for annual physical.   Needs refill of adderalkl   Also has a lot of joint pain     History/Other:   Review of Systems    Constitutional: Negative.  Negative for activity change, appetite change, diaphoresis and fatigue.     Respiratory: Negative.  Negative for apnea, cough, chest tightness and shortness of breath.    Cardiovascular: Negative.  Negative for chest pain, palpitations and leg swelling.   Gastrointestinal: Negative.  Negative for abdominal pain.   Skin: Negative.         MSK see hpi   Psychiatric/Behavioral: Negative.      Current Medications[1]  Allergies:Allergies[2]    Objective:   Physical Exam  Constitutional:       Appearance: She is well-developed.   Cardiovascular:      Rate and Rhythm: Normal rate and regular rhythm.      Heart sounds: Normal heart sounds.   Pulmonary:      Effort: Pulmonary effort is normal.      Breath sounds: Normal breath sounds.   Abdominal:      General: Bowel sounds are normal.      Palpations: Abdomen is soft.   Musculoskeletal:         General: Tenderness present.   Skin:     General: Skin is warm and dry.   Neurological:      Mental Status: She is alert.      Deep Tendon Reflexes: Reflexes are normal and symmetric.         Assessment & Plan:   1. Menstrual period late    2. Attention deficit hyperactivity disorder (ADHD), predominantly inattentive type    3. Annual physical exam    4. Arthralgia, unspecified joint    See rheumatology     Orders Placed This Encounter   Procedures    POC Urine pregnancy test [73519]    Hemoglobin A1C    Comp Metabolic Panel (14)    Lipid Panel    CBC With Differential With Platelet    Assay, Thyroid Stim Hormone    Vitamin D [E]       Meds This Visit:  Requested Prescriptions     Signed Prescriptions Disp Refills    albuterol 108 (90 Base) MCG/ACT Inhalation Aero Soln 18 g 2     Sig: Inhale 2 puffs into the lungs every 4 (four) hours as needed.     amphetamine-dextroamphetamine (ADDERALL) 30 MG Oral Tab 30 tablet 0     Sig: Take 1 tablet (30 mg total) by mouth daily.    amphetamine-dextroamphetamine (ADDERALL) 30 MG Oral Tab 30 tablet 0     Sig: Take 1 tablet (30 mg total) by mouth daily.    amphetamine-dextroamphetamine (ADDERALL) 30 MG Oral Tab 30 tablet 0     Sig: Take 1 tablet (30 mg total) by mouth daily.       Imaging & Referrals:  RHEUMATOLOGY - INTERNAL         [1]   Current Outpatient Medications   Medication Sig Dispense Refill    albuterol 108 (90 Base) MCG/ACT Inhalation Aero Soln Inhale 2 puffs into the lungs every 4 (four) hours as needed. 18 g 2    amphetamine-dextroamphetamine (ADDERALL) 30 MG Oral Tab Take 1 tablet (30 mg total) by mouth daily. 30 tablet 0    [START ON 6/20/2025] amphetamine-dextroamphetamine (ADDERALL) 30 MG Oral Tab Take 1 tablet (30 mg total) by mouth daily. 30 tablet 0    [START ON 7/21/2025] amphetamine-dextroamphetamine (ADDERALL) 30 MG Oral Tab Take 1 tablet (30 mg total) by mouth daily. 30 tablet 0    amphetamine-dextroamphetamine (ADDERALL) 30 MG Oral Tab Take 1 tablet (30 mg total) by mouth daily. 30 tablet 0    Spacer/Aero-Holding Chambers Does not apply Device Use with albuterol inhaler as needed 1 each 0   [2]   Allergies  Allergen Reactions    Wellbutrin [Bupropion] RASH

## 2025-06-23 NOTE — MR AVS SNAPSHOT
Nisha  [address]  [phone]               Thank you for choosing us for your health care visit with Nurse.   We are glad to serve you and happy to provide you with this summary of your vis Assoc Dx:  Encounter for supervision of normal first pregnancy in first trimester [Z34.01]           Virginia Campbell.    Complete by:  Jan 19, 2017 (Approximate)    Assoc Dx:  Encounter for supervision of normal first pregnancy in first trimes Visit [facility] online at  [email or web address] LE swelling and erythema

## 2025-08-25 DIAGNOSIS — F90.9 ATTENTION DEFICIT HYPERACTIVITY DISORDER (ADHD), UNSPECIFIED ADHD TYPE: ICD-10-CM

## 2025-08-31 RX ORDER — DEXTROAMPHETAMINE SACCHARATE, AMPHETAMINE ASPARTATE, DEXTROAMPHETAMINE SULFATE AND AMPHETAMINE SULFATE 7.5; 7.5; 7.5; 7.5 MG/1; MG/1; MG/1; MG/1
30 TABLET ORAL DAILY
Qty: 30 TABLET | Refills: 0 | Status: SHIPPED | OUTPATIENT
Start: 2025-08-31

## (undated) NOTE — LETTER
11/13/2017              Damaso Jenkins        [address] [facility] [address]         To whom it may concern,     This letter is to inform that Damaso Jenkins was seen on 9/6/17 for her post partum visit and is medically cleared to return to work

## (undated) NOTE — LETTER
6/11/2021          To Whom It May Concern:    Tere Blunt is currently under my medical care and may not return to work at this time. Please excuse Laura Escamilla for 4 days. He may return to work on 6/16/2021. Activity is restricted as follows: none.

## (undated) NOTE — IP AVS SNAPSHOT
[address]  602 [facility] [phone]                Discharge Summary   6/19/2017    Jennifer Bautista           Admission Information        Provider Department    6/19/2017 Katie Lamas CNM [facility] 3e C-D Call your [facility] doctor if you have any of the following sings:    · Period-like cramps that may come and go  · Low, dull backache  · Pressure in your again or lower abdomen that may feel like the baby is pushing down  · Change in the type or amount of vaginal [address]  [facility] [address]   [phone]            Jul 01, 2017  9:45 AM   Return OB with Glenn Baca, [address] [place] [address], [address] [place] [address] Jose ([address] coverage. Patient [address] is a Federal Navigator program that can help with your Affordable Care Act coverage, as well as all types of Medicaid plans.   To get signed up and covered, please call [phone] and ask to get set up for an insuran

## (undated) NOTE — LETTER
06/25/21        Arie Maher  [address] [place] [address]      Dear Napoleon Stover records indicate that you have outstanding lab work and or testing that was ordered for you and has not yet been completed:     C-REACTIVE PROTEIN   IMM

## (undated) NOTE — IP AVS SNAPSHOT
[address]  602 [facility] [phone]                Discharge Summary   4/10/2017    Fabienne Bowen           Admission Information        Provider Department    4/10/2017 Aniceto Mcintyre MD [facility] Maternal US Arleen [address] AND DTL FTL ANT SLG(CPT=76811)  4/10/2017 (Approximate) 4/10/2018    Scheduling Instructions:     To schedule a test at any [facility], [employer] Scheduling at [phone], Monday through Friday between 7: 8 (03/13/17)  4 (03/13/17)  1 -- (03/13/17)  5.4 (03/13/17)  2.6 (03/13/17)  0.8 (03/13/17)  0.3 (03/13/17)  0.1    (03/11/17)  64 (03/11/17)  24 (03/11/17)  8 (03/11/17)  4 (03/11/17)  1  (03/11/17)  6.1 (03/11/17)  2.3 (03/11/17)  0.7 (03/11/17)  0.4 (03 Summaries. If you've been to the Emergency Department or your doctor's office, you can view your past visit information in [employer] by going to Visits < Visit Summaries. [employer]ERS questions? Call [phone] for help.   [employer] is NOT to be used for urge

## (undated) NOTE — Clinical Note
To Whom It May Concern:    Uriel Mcleodtricia has been under our care regarding a medical issues.     She may resume her usual activities, including work, on 06/21/17 with the following restrictions:      None         No heavy lifting (over 15 pounds) for

## (undated) NOTE — IP AVS SNAPSHOT
Patient Demographics     Address Phone E-mail Address    [address] [facility] [address] [place] [address] [phone] [facility])  [phone] (Work)  [phone] (Mobile) [email or web address]      Emergency Contact(s)     Name Relation Home Work Mobile    C Future Appointments        Provider Dipak Arevalo    6/27/2017 12:00 PM [address] for Health Ultrasound [facility] THE [facility]    7/1/2017 9:45 AM Bib Culp [facility], [address] [facility] OF THE [facility]    7/6/2017 6:

## (undated) NOTE — LETTER
Date & Time: 3/10/2020, 9:57 AM  Patient: Jh Pizarro  Encounter Provider(s):    BLAINE Escobedo       To Whom It May Concern:    Jh Pizarro was seen and treated in our department on 3/10/2020. She should not return to work until 3/12/20.     I

## (undated) NOTE — IP AVS SNAPSHOT
Patient Demographics     Address Phone E-mail Address    [address] [phone] [facility])  [phone] (Work)  [phone] (Mobile) [email or web address]      Emergency Contact(s)     Name Relation Home Work Zachariah Phoenix 5/6/2017 9:15 AM Kianna Camarillo [address] [place] [address]

## (undated) NOTE — MR AVS SNAPSHOT
Nisha  [address]  [phone]               Thank you for choosing us for your health care visit with Nora Martinez CNM.   We are glad to serve you and happy to provide you with this s authorization, such as [place], please feel free to schedule your appointment immediately. However, if you are unsure about the requirements for authorization, please wait 5-7 days and then contact your physician's office.  At that time, you will

## (undated) NOTE — LETTER
2/6/2018              [address] [facility] [address]         Dear Ruthy Harris,     We have been unable to reach you by phone. You recently missed your appointment with Breanna Lin CNM.  Please contact our office at your ea

## (undated) NOTE — Clinical Note
VACCINE ADMINISTRATION RECORD  PARENT / GUARDIAN APPROVAL  Date: 6/3/2017  Vaccine administered to: Lon Cruz     : 1990    MRN: [id number]    A copy of the appropriate Centers for Disease Control and Prevention Vaccine Information statement ha

## (undated) NOTE — LETTER
5/23/2018              Brittney Flores        [address] [address]         Dear Ruthy Harris,    This letter is to inform you that our office has made several attempts to reach you by phone without success.   We were attempting to contact you

## (undated) NOTE — ED AVS SNAPSHOT
[facility] Emergency Department    [address]     [address] [place] [address]    Phone:  [phone]    Fax:  [phone]           Cal Mai   MRN: [id number]    Department:  [facility] Emergency Department   Date of Visit:  3/13/2 Si tiene problemas para programar sam courtney de seguimiento según lo indicado, llame al encargado de sedrick al [phone]. It is our goal to assure that you are completely satisfied with every aspect of your visit today.   In an effort to constantly impr list to your next doctor's appointment. Any imaging studies and labs completed today can be reviewed in your MyChart account. You may have had testing done that requires us to contact you. Please make sure we have your correct phone number on file. Visit [employer]  You can access your MyChart to more actively manage your health care and view more details from this visit by going to [email or web address].   If you've recently had a stay at the Hospital you can access your discharge instructions i

## (undated) NOTE — LETTER
To Whom It May Concern:    Ayse Aguilar has been under our care.      She may resume her usual activities, including work, on 11/14/17 with the following restrictions:      None         No heavy lifting (over 15 pounds) for               weeks       Part

## (undated) NOTE — Clinical Note
Med exposure - normal level II, marginal CI. Likely restarting some mood stabilizer.   Rec third trimester US

## (undated) NOTE — ED AVS SNAPSHOT
[facility] Emergency Department    [address]     [address] [place] [address]    Phone:  [phone]    Fax:  [phone]           Андрей Simmons   MRN: [id number]    Department:  [facility] Emergency Department   Date of Visit:  3/13/2 and Class Registration line at [phone] or find a doctor online by visiting [email or web address].    IF THERE IS ANY CHANGE OR WORSENING OF YOUR CONDITION, CALL YOUR PRIMARY CARE PHYSICIAN AT ONCE OR RETURN IMMEDIATELY TO [address] [place] [address].     If

## (undated) NOTE — IP AVS SNAPSHOT
[address]  602 [facility] ~ [phone]                Discharge Summary   6/20/2017    Brianda Azar           Admission Information        Provider Department    6/20/2017 Jamar Nelson MD [facility] Maternal [address] ([facility] 14 ) [address]  [address] [address] [phone]              Discharge Orders     Future Labs/Procedures Expected by Expires    [address] COMPL PREG SURVEY >14 WK (CPT=49557)  6 None         Additional Information       We are concerned for your overall well being:    - If you are a smoker or have smoked in the last 12 months, we encourage you to explore options for quitting.     - If you have concerns related to behavioral healt call your provider or healthcare team if you have any questions regarding your medications while at home.          Mood and Thought Medications     Sertraline HCl 50 MG Oral Tab       Use: Treat conditions such as depression and thought disorders   Most com

## (undated) NOTE — LETTER
Leah Savage, Gamap-c  [address]  [address]  [address] [place], [place] [address]       04/17/18        Patient: Suresh Mullen   YOB: 1990   Date of Visit: 4/17/2018       Dear  Dr. Venus Parsons, JEREL, FNP-C,      Thank you for referring [facility]

## (undated) NOTE — IP AVS SNAPSHOT
[address]  602 [facility] [phone]                Discharge Summary   2/10/2017    [facility]           Admission Information        Provider Department    2/10/2017 Alexandra Arnold MD [facility] Mater Central Scheduling at [phone], Monday through Friday between 7:30am to 6pm and on Saturday between 8am and 1pm. Evening and weekend appointments for your exam are available. Walk-in patients are welcome for most exams.      [facility] and ask to get set up for an insurance coverage that is in-network with Bruno Butts.         MyChart     Visit [employer]  You can access your Deanslisthart to more actively manage your health care and view more details from this visit by going to https:/

## (undated) NOTE — IP AVS SNAPSHOT
Patient Demographics     Address Phone E-mail Address    [address] [facility] [phone] (Home) [email or web address]      Emergency Contact(s)     Name Relation Home Work Mobile    Lombardi,Kim Next of [address] [facility]